# Patient Record
Sex: FEMALE | Race: WHITE | Employment: FULL TIME | URBAN - METROPOLITAN AREA
[De-identification: names, ages, dates, MRNs, and addresses within clinical notes are randomized per-mention and may not be internally consistent; named-entity substitution may affect disease eponyms.]

---

## 2018-11-21 ENCOUNTER — TRANSFERRED RECORDS (OUTPATIENT)
Dept: HEALTH INFORMATION MANAGEMENT | Facility: CLINIC | Age: 48
End: 2018-11-21

## 2018-11-29 ENCOUNTER — TRANSFERRED RECORDS (OUTPATIENT)
Dept: HEALTH INFORMATION MANAGEMENT | Facility: CLINIC | Age: 48
End: 2018-11-29

## 2018-12-06 ENCOUNTER — TRANSFERRED RECORDS (OUTPATIENT)
Dept: HEALTH INFORMATION MANAGEMENT | Facility: CLINIC | Age: 48
End: 2018-12-06

## 2018-12-19 NOTE — TELEPHONE ENCOUNTER
Rec'd Vm from [facility] - Said they were sending slides over to us. Returned call, left voice message. Called [facility] they have no record of pt having mammogram done @ their site, or any of their associated sites.   3:03 PM

## 2018-12-19 NOTE — TELEPHONE ENCOUNTER
Path request sent to [facility]. Called [facility], left voice message. Also called [facility]  [phone], left a voice message requesting   11:01 AM

## 2018-12-19 NOTE — TELEPHONE ENCOUNTER
RECORDS STATUS - BREAST    RECORDS RECEIVED FROM: [facility], [facility], [facility]   DATE RECEIVED: December 19, 2018   NOTES STATUS DETAILS   OFFICE NOTE from referring provider     OFFICE NOTE from medical oncologist     OFFICE NOTE from surgeon     OFFICE NOTE from radiation oncologist     DISCHARGE SUMMARY from hospital     DISCHARGE REPORT from the ER     OPERATIVE REPORT     MEDICATION LIST     CLINICAL TRIAL TREATMENTS TO DATE     LABS     PATHOLOGY REPORTS  (Tissue diagnosis, Stage, ER/MD percentage positive and intensity of staining, HER2 IHC, FISH, and all biopsies from breast and any distant metastasis)                     GENONOMIC TESTING     TYPE:   (Next Generation Sequencing, including Foundation One testing, and Oncotype score)     IMAGING (NEED IMAGES & REPORT)     CT SCANS     MRI     MAMMO     ULTRASOUND     PET     BONE SCAN     BRAIN MRI

## 2018-12-20 ENCOUNTER — PRE VISIT (OUTPATIENT)
Dept: ONCOLOGY | Facility: CLINIC | Age: 48
End: 2018-12-20

## 2018-12-20 ENCOUNTER — ONCOLOGY VISIT (OUTPATIENT)
Dept: ONCOLOGY | Facility: CLINIC | Age: 48
End: 2018-12-20
Attending: SURGERY
Payer: COMMERCIAL

## 2018-12-20 VITALS
BODY MASS INDEX: 38.05 KG/M2 | RESPIRATION RATE: 14 BRPM | DIASTOLIC BLOOD PRESSURE: 83 MMHG | TEMPERATURE: 97.3 F | OXYGEN SATURATION: 99 % | HEART RATE: 70 BPM | SYSTOLIC BLOOD PRESSURE: 135 MMHG | WEIGHT: 228.4 LBS | HEIGHT: 65 IN

## 2018-12-20 DIAGNOSIS — D05.12 DUCTAL CARCINOMA IN SITU (DCIS) OF LEFT BREAST: ICD-10-CM

## 2018-12-20 PROCEDURE — G0463 HOSPITAL OUTPT CLINIC VISIT: HCPCS | Mod: ZF

## 2018-12-20 RX ORDER — CEFAZOLIN SODIUM 2 G/50ML
2 SOLUTION INTRAVENOUS
Status: CANCELLED | OUTPATIENT
Start: 2018-12-20

## 2018-12-20 RX ORDER — CEFAZOLIN SODIUM 1 G/50ML
1 INJECTION, SOLUTION INTRAVENOUS SEE ADMIN INSTRUCTIONS
Status: CANCELLED | OUTPATIENT
Start: 2018-12-20

## 2018-12-20 ASSESSMENT — MIFFLIN-ST. JEOR: SCORE: 1671.9

## 2018-12-20 NOTE — TELEPHONE ENCOUNTER
Left VM @ [facility]. Spoke w/ [facility] @ [facility] - pushing Mammogram to PACS. Spoke w/ Damion - [facility] - he couldn't really see any chart/office visit notes for the pt w/ her provider. Last visit they had was 8/25/2017-Per Damion they are faxing over Mammogram Reports & last office visit note.  7:26 AM

## 2018-12-20 NOTE — TELEPHONE ENCOUNTER
I called & spoke with Reshma Hendricks re: records needed for visit. As we went through [facility]where together, we came to the conclusion the mammography reports from [facility] is what is needed. I have contacted [facility] & the reports will be faxed to me shortly. Reshma asked that I just have them scanned ASAP as the patient has already left.

## 2018-12-20 NOTE — TELEPHONE ENCOUNTER
Pathology slides received from [facility] & sent to [facility] path dept for review.    Case # [id number] (13 slides & 1 report)

## 2018-12-20 NOTE — PROGRESS NOTES
HISTORY OF PRESENT ILLNESS:  Marlene Pascual is a 48-year-old woman who presents for a new diagnosis of ductal carcinoma in situ of the left breast.  She had a screening mammogram which revealed an area of microcalcifications in the left breast measuring about 1 cm in size.  She had a core-needle biopsy which revealed a grade 2 ductal carcinoma in situ, ER positive.  She is now here to talk about treatment options.  She has not had any prior history of any breast problems.      FAMILY HISTORY:  Unknown because she is adopted.      PAST MEDICAL HISTORY:  Significant for no major medical problems.      PHYSICAL EXAMINATION:     GENERAL:  She is a well-appearing woman in no apparent distress.     HEAD AND NECK EXAMINATION:  Reveals no cervical, supraclavicular or infraclavicular lymphadenopathy.   CHEST:  Bilateral breast examination reveals no dominant masses, skin change, nipple changes or axillary adenopathy.      ASSESSMENT:  A 1 cm DCIS, left breast.      PLAN:  I talked to her about the natural history of DCIS and treatment options.  I did recommend a minimum of a lumpectomy or excision.  Depending upon the findings, she may or may not benefit from radiation therapy.  However, before her surgery, I have recommended that she see a genetic counselor and consider genetic testing because of her young age and unknown family history.  We will tentatively schedule her for both of those.      TT:  30 minutes.  CT:  20 minutes.

## 2018-12-21 ENCOUNTER — TELEPHONE (OUTPATIENT)
Dept: ONCOLOGY | Facility: CLINIC | Age: 48
End: 2018-12-21

## 2018-12-21 PROCEDURE — 00000346 ZZHCL STATISTIC REVIEW OUTSIDE SLIDES TC 88321: Performed by: SURGERY

## 2018-12-21 NOTE — TELEPHONE ENCOUNTER
ONCOLOGY INTAKE: Records Information      APPT INFORMATION: 01/09   Referring provider:  Dr. Brito  Referring provider s clinic:  Oncology  Reason for visit/diagnosis:  ductal carcinoma in situ of the left breast    Were the records received with the referral (via [employer])? Complete    Has patient been seen for any external appt for this diagnosis (enter clinic/location)? NA

## 2018-12-24 ENCOUNTER — TELEPHONE (OUTPATIENT)
Dept: ONCOLOGY | Facility: CLINIC | Age: 48
End: 2018-12-24

## 2019-01-02 NOTE — TELEPHONE ENCOUNTER
RECORDS STATUS - BREAST    RECORDS RECEIVED FROM: [facility]   DATE RECEIVED: 1/2/19   NOTES STATUS DETAILS   OFFICE NOTE from referring provider     OFFICE NOTE from medical oncologist     OFFICE NOTE from surgeon     OFFICE NOTE from radiation oncologist     DISCHARGE SUMMARY from hospital     DISCHARGE REPORT from the      OPERATIVE REPORT     MEDICATION LIST     CLINICAL TRIAL TREATMENTS TO DATE     LABS     PATHOLOGY REPORTS  (Tissue diagnosis, Stage, ER/ID percentage positive and intensity of staining, HER2 IHC, FISH, and all biopsies from breast and any distant metastasis)                     GENONOMIC TESTING     TYPE:   (Next Generation Sequencing, including Foundation One testing, and Oncotype score)     IMAGING (NEED IMAGES & REPORT)     CT SCANS     MRI     MAMMO     ULTRASOUND     PET     BONE SCAN     BRAIN MRI

## 2019-01-04 ENCOUNTER — TRANSFERRED RECORDS (OUTPATIENT)
Dept: HEALTH INFORMATION MANAGEMENT | Facility: CLINIC | Age: 49
End: 2019-01-04

## 2019-01-09 ENCOUNTER — OFFICE VISIT (OUTPATIENT)
Dept: ONCOLOGY | Facility: CLINIC | Age: 49
End: 2019-01-09
Attending: GENETIC COUNSELOR, MS
Payer: COMMERCIAL

## 2019-01-09 ENCOUNTER — PRE VISIT (OUTPATIENT)
Dept: ONCOLOGY | Facility: CLINIC | Age: 49
End: 2019-01-09

## 2019-01-09 DIAGNOSIS — D05.12 DUCTAL CARCINOMA IN SITU (DCIS) OF LEFT BREAST: ICD-10-CM

## 2019-01-09 DIAGNOSIS — D05.12 DUCTAL CARCINOMA IN SITU (DCIS) OF LEFT BREAST: Primary | ICD-10-CM

## 2019-01-09 LAB — MISCELLANEOUS TEST: NORMAL

## 2019-01-09 PROCEDURE — 96040 ZZH GENETIC COUNSELING, EACH 30 MINUTES: CPT | Mod: ZF | Performed by: GENETIC COUNSELOR, MS

## 2019-01-09 NOTE — PATIENT INSTRUCTIONS
Assessing Cancer Risk  Only about 5-10% of cancers are thought to be due to an inherited cancer susceptibility gene.    These families often have:    Several people with the same or related types of cancer    Cancers diagnosed at a young age (before age 50)    Individuals with more than one primary cancer    Multiple generations of the family affected with cancer    Some people may be candidates for genetic testing of more than one gene.  For these families, genetic testing using a cancer panel may be offered.  These panels will test different genes known to increase the risk for breast, ovarian, uterine, and/or other cancers. All of the genes discussed below have published clinical management guidelines for individuals who are found to carry a mutation. The purpose of this handout is to serve as a brief summary of the genes analyzed by the panels used to inquire about hereditary breast and gynecologic cancer:  STACEY, BRCA1, BRCA2, BRIP1, CDH1, CHEK2, MLH1, MSH2, MSH6, PMS2, EPCAM, PTEN, PALB2, RAD51C, RAD51D, and TP53.  ______________________________________________________________________________  Hereditary Breast and Ovarian Cancer Syndrome   (BRCA1 and BRCA2)  A single mutation in one of the copies of BRCA1 or BRCA2 increases the risk for breast and ovarian cancer, among others.  The risk for pancreatic cancer and melanoma may also be slightly increased in some families.  The chart below shows the chance that someone with a BRCA mutation would develop cancer in his or her lifetime1,2,3,4.        A person s ethnic background is also important to consider, as individuals of Ashkenazi Shinto ancestry have a higher chance of having a BRCA gene mutation.  There are three BRCA mutations that occur more frequently in this population.    Short Syndrome   (MLH1, MSH2, MSH6, PMS2, and EPCAM)  Currently five genes are known to cause Short Syndrome: MLH1, MSH2, MSH6, PMS2, and EPCAM.  A single mutation in one of the  Short Syndrome genes increases the risk for colon, endometrial, ovarian, and stomach cancers.  Other cancers that occur less commonly in Short Syndrome include urinary tract, skin, and brain cancers.  The chart below shows the chance that a person with Short syndrome would develop cancer in his or her lifetime5.      *Cancer risk varies depending on Short syndrome gene found    Cowden Syndrome   (PTEN)  Cowden syndrome is a hereditary condition that increases the risk for breast, thyroid, endometrial, colon, and kidney cancer.  Cowden syndrome is caused by a mutation in the PTEN gene.  A single mutation in one of the copies of PTEN causes Cowden syndrome and increases cancer risk.  The chart below shows the chance that someone with a PTEN mutation would develop cancer in their lifetime6,7.  Other benign features seen in some individuals with Cowden syndrome include benign skin lesions (facial papules, keratoses, lipomas), learning disability, autism, thyroid nodules, colon polyps, and larger head size.      *One recent study found breast cancer risk to be increased to 85%    Li-Fraumeni Syndrome   (TP53)  Li-Fraumeni Syndrome (LFS) is a cancer predisposition syndrome caused by a mutation in the TP53 gene. A single mutation in one of the copies of TP53 increases the risk for multiple cancers. Individuals with LFS are at an increased risk for developing cancer at a young age. The lifetime risk for development of a LFS-associated cancer is 50% by age 30 and 90% by age 60.   Core Cancers: Sarcomas, Breast, Brain, Lung, Leukemias/Lymphomas, Adrenocortical carcinomas  Other Cancers: Gastrointestinal, Thyroid, Skin, Genitourinary    Hereditary Diffuse Gastric Cancer   (CDH1)  Currently, one gene is known to cause hereditary diffuse gastric cancer (HDGC): CDH1.  Individuals with HDGC are at increased risk for diffuse gastric cancer and lobular breast cancer. Of people diagnosed with HDGC, 30-50% have a mutation in the CDH1  gene.  This suggests there are likely other genes that may cause HDGC that have not been identified yet.      Lifetime Cancer Risks    General Population HDGC    Diffuse Gastric  <1% ~80%   Breast 12% 39-52%         Additional Genes  STACEY  STACEY is a moderate-risk breast cancer gene. Women who have a mutation in STACEY can have between a 2-4 fold increased risk for breast cancer compared to the general population8. STACEY mutations have also been associated with increased risk for pancreatic cancer, however an estimate of this cancer risk is not well understood9. Individuals who inherit two STACEY mutations have a condition called ataxia-telangiectasia (AT).  This rare autosomal recessive condition affects the nervous system and immune system, and is associated with progressive cerebellar ataxia beginning in childhood.  Individuals with ataxia-telangiectasia often have a weakened immune system and have an increased risk for childhood cancers.    PALB2  Mutations in PALB2 have been shown to increase the risk of breast cancer up to 33-58% in some families; where individuals fall within this risk range is dependent upon family [id number]. PALB2 mutations have also been associated with increased risk for pancreatic cancer, although this risk has not been quantified yet.  Individuals who inherit two PALB2 mutations--one from their mother and one from their father--have a condition called Fanconi Anemia.  This rare autosomal recessive condition is associated with short stature, developmental delay, bone marrow failure, and increased risk for childhood cancers.    CHEK2   CHEK2 is a moderate-risk breast cancer gene.  Women who have a mutation in CHEK2 have around a 2-fold increased risk for breast cancer compared to the general population, and this risk may be higher depending upon family history.11,12,13 Mutations in CHEK2 have also been shown to increase the risk of a number of other cancers, including colon and prostate, however  these cancer risks are currently not well understood.    BRIP1, RAD51C and RAD51D  Mutations in BRIP1, RAD51C, and RAD51D have been shown to increase the risk of ovarian cancer and possibly female breast cancer as well14,15 .       Lifetime Cancer Risk    General Population BRIP1 RAD51C RAD51D   Ovarian 1-2% ~5-8% ~5-9% ~7-15%           Inheritance  All of the cancer syndromes reviewed above are inherited in an autosomal dominant pattern.  This means that if a parent has a mutation, each of his or her children will have a 50% chance of inheriting that same mutation.  Therefore, each child--male or female--would have a 50% chance of being at increased risk for developing cancer.      Image obtained from Genetics Home Reference, 2013     Mutations in some genes can occur de tyra, which means that a person s mutation occurred for the first time in them and was not inherited from a parent.  Now that they have the mutation, however, it can be passed on to future generations.    Genetic Testing  Genetic testing involves a blood test and will look at the genetic information in the STACEY, BRCA1, BRCA2, BRIP1, CDH1, CHEK2, MLH1, MSH2, MSH6, PMS2, EPCAM, PTEN, PALB2, RAD51C, RAD51D, and TP53 genes for any harmful mutations that are associated with increased cancer risk.  If possible, it is recommended that the person(s) who has had cancer be tested before other family members.  That person will give us the most useful information about whether or not a specific gene is associated with the cancer in the family.    Results  There are three possible results of genetic testing:    Positive--a harmful mutation was identified in one or more of the genes    Negative--no mutation was identified in any of the genes on this panel    Variant of unknown significance--a variation in one of the genes was identified, but it is unclear how this impacts cancer risk in the family    Advantages and Disadvantages   There are advantages and  disadvantages to genetic testing.    Advantages    May clarify your cancer risk    Can help you make medical decisions    May explain the cancers in your family    May give useful information to your family members (if you share your results)    Disadvantages    Possible negative emotional impact of learning about inherited cancer risk    Uncertainty in interpreting a negative test result in some situations    Possible genetic discrimination concerns (see below)    Genetic Information Nondiscrimination Act (CHRISTIN)  CHRISTIN is a federal law that protects individuals from health insurance or employment discrimination based on a genetic test result alone.  Although rare, there are currently no legal discrimination protections in terms of life insurance, long term care, or disability insurances.  Visit the [employer] [place] website to learn more.    Reducing Cancer Risk  All of the genes described above have nationally recognized cancer screening guidelines that would be recommended for individuals who test positive.  In addition to increased cancer screening, surgeries may be offered or recommended to reduce cancer risk.  Recommendations are based upon an individual s genetic test result as well as their personal and family history of cancer.    Questions to Think About Regarding Genetic Testing:    What effect will the test result have on me and my relationship with my family members if I have an inherited gene mutation?  If I don t have a gene mutation?    Should I share my test results, and how will my family react to this news, which may also affect them?    Are my children ready to learn new information that may one day affect their own health?    Hereditary Cancer Resources    FORCE: Facing Our Risk of Cancer Empowered facingourrisk.org   Bright Pink bebrightpink.org   Li-Fraumeni Syndrome Association lfsassociation.org   PTEN World PTENworld.com   No stomach for cancer, Inc.  nostomachforcancer.org   Stomach cancer relief network Scrnet.org   Collaborative Group of the Americas on Inherited Colorectal Cancer (CGA) cgaicc.com    Cancer Care cancercare.org   American Cancer Society (ACS) cancer.org   National Cancer Cashion (NCI) cancer.gov     Please call us if you have any questions or concerns.   Cancer Risk Management Program [phone] ([phone])  ? Felicita Rowland, MS, [facility]  [phone]  ? Victoria Arredondo, MS, [facility]  [phone]  ? Adelaida Kendrick, MS, [facility]  [phone]  ? Mariano Boyd, MS, [facility]  [phone]  ? Maddie Cas, MS, [facility] [phone]    References  1. Iglesia SNOW, Jaqueline PDP, Flavio S, Niko ADHIKARI, Charlette JE, Sulema JL, Eliseo N, Nicolasa H, Debo O, Marisol A, Kati B, Carlito P, Darrius S, Nick DM, Rupert N, Kathryn E, Catarino H, Travon E, Lubinski J, Gronwald J, Chavez B, Tulinius H, Thorlacius S, Eerola H, Dipika H, Monica K, Chidi OP. Average risks of breast and ovarian cancer associated with BRCA1 or BRCA2 mutations detected in case series unselected for family history: a combined analysis of 222 studies. Am J Hum Lilliam. 2003;72:1117-30.  2. Elle N, Ariadna M, Maurizio G.  BRCA1 and BRCA2 Hereditary Breast and Ovarian Cancer. Gene Reviews online. 2013.  3. Felipe YC, Ina S, Abdias G, Ahn S. Breast cancer risk among male BRCA1 and BRCA2 mutation carriers. J Natl Cancer Inst. 2007;99:1811-4.  4. Teja ARREDONDO, Jeffry I, Dylan J, Eliecer E, Kassandra ER, Jeanine F. Risk of breast cancer in male BRCA2 carriers. J Med Lilliam. 2010;47:710-1.  5. National Comprehensive Cancer Network. Clinical practice guidelines in oncology, colorectal cancer screening. Available online (registration required). 2015.  6. Rocael PERRY, Lolis J, Conchis J, Sakshi LA, Sim MS, Eng C. Lifetime cancer risks in individuals with germline PTEN mutations. Clin Cancer Res. 2012;18:400-7.  7. Aracelis SEWELL. Cowden Syndrome: A Critical Review of the Clinical Literature. J Lilliam .  2009:18:13-27.  8. Gisselle A, Evan D, Warren S, Chinyere P, Chaparro T, Kia M, Elias B, Gabe H, Honey R, Skyler K, Madelyn L, Teja DG, Nick D, Dandre DF, Sánchez MR, The Breast Cancer Susceptibility Collaboration () & Nataliia CORONA. STACEY mutations that cause ataxia-telangiectasia are breast cancer susceptibility alleles. Nature Genetics. 2006;38:873-875  9. Grayson N , Judah Y, Olga J, Shayy L, Aileen GM , Chayito ML, Gallinger S, Acosta AG, Syngal S, Jeana ML, Morena J , Claudio R, Meg SZ, Maryam JR, Cheo VE, Lyle M, Vocarmelo B, Anibal N, Gayathri RH, Myah KW, and Izabela AP. STACEY mutations in patients with hereditary pancreatic cancer. Cancer Discover. 2012;2:41-46  10. Iglesia KATZ, et al. Breast-Cancer Risk in Families with Mutations in PALB2. NEJM. 2014; 371(6):497-506.  11. CHEK2 Breast Cancer Case-Control Consortium. CHEK2*1100delC and susceptibility to breast cancer: A collaborative analysis involving 10,860 breast cancer cases and 9,065 controls from 10 studies. Am J Hum Lilliam, 74 (2004), pp. 2131-9452  12. Abel T, Jordon S, Julius K, et al. Spectrum of Mutations in BRCA1, BRCA2, CHEK2, and TP53 in Families at High Risk of Breast Cancer. AGATHA. 2006;295(12):1095-9683.   13. Kervin C, Marcio D, Stuart A, et al. Risk of breast cancer in women with a CHEK2 mutation with and without a family history of breast cancer. J Clin Oncol. 2011;29:5240-0485.  14. Kit MC, Khushbu E, Shirin SJ, et al. Contribution of germline mutations in the RAD51B, RAD51C, and RAD51D genes to ovarian cancer in the population. J Clin Oncol. 2015;33(26):6875-9401. Doi:10.1200/JCO.2015.61.2408.  15. Kwan T, Phyllis REAL, Curt P, et al. Mutations in BRIP1 confer high risk of ovarian cancer. Anay Lilliam. 2011;43(11):9960-7914. doi:10.1038/ng.955.

## 2019-01-09 NOTE — LETTER
1/9/2019       RE: Marlene Pascual  [address]  [facility] [address]     Dear Colleague,    Thank you for referring your patient, Marlene Pascual, to the [facility]. Please see a copy of my visit note below.    1/9/2019    Referring Provider: Axel Brito MD    Presenting Information:   I met with Marlene Pascual today for genetic counseling at the Cancer Risk Management Program at the [facility] to discuss her personal history of breast cancer.  She is here today to review this history and available genetic testing options.    Personal History:  Marlene is a 48 year old female.  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  She had her first menstrual period at age 13, has not gone through menopause and does not have children.  Marlene has her ovaries, fallopian tubes and uterus in place, and reports that she has a pelvic ultrasound planned for this Friday through her Ob-GYN office due to pain during her menstrual cycle.  She reports a history of one benign mole removed from her back.        Family History: (Please see scanned pedigree for detailed family history information)    No information is available regarding Marlene's biological family due to adoption    Her reported ethnicity is Scottish/Romanian.      Discussion:    Marlene's personal history of breast cancer is suggestive of a hereditary cancer syndrome.    We reviewed the features of sporadic, familial, and hereditary cancers.  Based on her personal history of breast cancer diagnosed prior to age 51 and no family history information available due to adoption, Marlene meets current National Comprehensive Cancer Network (NCCN) criteria for genetic testing of BRCA1/2.      We discussed the natural history and genetics of Hereditary Breast and Ovarian Cancer syndrome caused by mutations in the BRCA1/2  genes. We discussed that there are additional genes that could cause increased risk for breast cancer.  As many of these genes present with overlapping features in a family and accurate cancer risk cannot always be established based upon the pedigree analysis alone, it would be reasonable for Marlene to consider panel genetic testing to analyze multiple genes at once.    A detailed handout regarding hereditary breast cancer and the information we discussed was provided to Marlene at the end of our appointment today and can be found in the after visit summary.  Topics included: inheritance pattern, cancer risks, cancer screening recommendations, and also risks, benefits and limitations of testing.    We reviewed genetic testing options for hereditary breast and gynecologic cancers: actionable high/moderate risk breast and gynecologic cancer risk custom panel (CustomNext-Cancer, 19 genes, a combination of GynPlus and BRCAplus + NBN, STK11, and NF1) and expanded high and moderate risk panel testing (OvaNext, 25 genes or CancerNext, 34 genes).  Marlene expressed an interest in learning as much information as possible from the testing given her limited family history information and early onset cancer. She opted for the CancerNext panel.  Genetic testing is available for 34 genes associated with hereditary cancer: CancerNext (APC, STACEY, BARD1, BRCA1, BRCA2, BRIP1, BMPR1A, CDH1, CDK4, CDKN2A, CHEK2, DICER1, EPCAM, GREM1, HOXB13, MLH1, MRE11A, MSH2, MSH6, MUTYH, NBN, NF1, PALB2, PMS2, POLD1, POLE, PTEN, RAD50, RAD51C, RAD51D, SMAD4, SMARCA4, STK11, and TP53).  We discussed that many of the genes in the CancerNext panel have known risks and published management guidelines.  Some genes are associated with specific hereditary cancer syndromes: Hereditary Breast and Ovarian Cancer syndrome (BRCA1, BRCA2), Short syndrome (MLH1, MSH2, MSH6, PMS2, EPCAM), Familial Adenomatous Polyposis syndrome (APC), Hereditary Gastric Cancer syndrome  (CDH1), Familial Atypical Multiple Mole Melanoma syndrome (CDK4, CDKN2A), Juvenile Polyposis syndrome (BMPR1A, SMAD4), Cowden syndrome (PTEN), Li Fraumeni syndrome (TP53), Peutz-Jeghers syndrome (STK11), MUTYH Associated Polyposis syndrome (MUTYH), and Neurofibromatosis type 1 (NF1).   The STACEY, BRIP1, CHEK2, GREM1, NBN , PALB2, POLD1, POLE, RAD51C, and RAD51D genes are associated with increased cancer risk and have published management guidelines for certain cancers.    The remaining genes (BARD1, DICER1, HOXB13, MRE11A, RAD50, and SMARCA4) are associated with increased cancer risk and may allow us to make medical recommendations when mutations are identified.    Consent was obtained and genetic testing for CancerNext was sent to [facility]. Turn around time: 3-4 weeks.  Results for 8 genes (BRCAplus) will be reported first within 1-2 weeks for surgical decision making.      Marlene was provided with a CancerNext handout, which lists the included genes and associated cancer risks, from [facility].    Medical Management: For Marlene, we reviewed that the information from genetic testing may determine:    surgery to treat Marlene's active cancer diagnosis (i.e. lumpectomy versus bilateral mastectomy),    additional cancer screening for which Marlene may qualify (i.e. mammogram and breast MRI if appropriate, more frequent colonoscopies, more frequent dermatologic exams, etc.),    options for risk reducing surgeries Marlene could consider (i.e. bilateral mastectomy, surgery to remove the ovaries and/or uterus, etc.),      and targeted chemotherapies for certain cancers if needed in the future (i.e. immunotherapy for individuals with Short syndrome, PARP inhibitors, etc.).     These recommendations will be discussed in detail once genetic testing is completed.     Plan:  1) Today Marlene elected to proceed with the CancerNext gene panel offered through [facility].  2) This information should be available in  3-4 weeks, with results for 8 STAT breast cancer genes within 1-2 weeks.  3) Marlene will be contacted once results are available.     Face to face time: 45 minutes    Adelaida Kendrick MS, [facility]  Licensed Genetic Counselor  [phone]

## 2019-01-09 NOTE — LETTER
Cancer Risk Management  Program Locations    [facility] Cancer [facility] Cancer [facility]  Mailing Address  Cancer Risk Management Program  [facility]  [address] [place] [address]    New patient appointments  [phone]  January 11, 2019    Marlene Pascual  [address]   [facility] [address]      Dear Marlene,    It was a pleasure meeting with you at the [facility] on 1/9/2019.  Here is a copy of the progress note from your recent genetic counseling visit to the Cancer Risk Management Program.  If you have any additional questions, please feel free to call.    Referring Provider: Axel Brito MD    Presenting Information:   I met with Marlene Pascual today for genetic counseling at the Cancer Risk Management Program at the [facility] to discuss her personal history of breast cancer.  She is here today to review this history and available genetic testing options.    Personal History:  Marlene is a 48 year old female.  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  She had her first menstrual period at age 13, has not gone through menopause and does not have children.  Marlene has her ovaries, fallopian tubes and uterus in place, and reports that she has a pelvic ultrasound planned for this Friday through her Ob-GYN office due to pain during her menstrual cycle.  She reports a history of one benign mole removed from her back.        Family History: (Please see scanned pedigree for detailed family history information)    No information is available regarding Marlene's biological family due to adoption    Her reported ethnicity is Bengali/Croatian.      Discussion:    Marlene's personal  history of breast cancer is suggestive of a hereditary cancer syndrome.    We reviewed the features of sporadic, familial, and hereditary cancers.  Based on her personal history of breast cancer diagnosed prior to age 51 and no family history information available due to adoption, Marlene meets current National Comprehensive Cancer Network (NCCN) criteria for genetic testing of BRCA1/2.      We discussed the natural history and genetics of Hereditary Breast and Ovarian Cancer syndrome caused by mutations in the BRCA1/2 genes. We discussed that there are additional genes that could cause increased risk for breast cancer.  As many of these genes present with overlapping features in a family and accurate cancer risk cannot always be established based upon the pedigree analysis alone, it would be reasonable for Marlene to consider panel genetic testing to analyze multiple genes at once.    A detailed handout regarding hereditary breast cancer and the information we discussed was provided to Marlene at the end of our appointment today and can be found in the after visit summary.  Topics included: inheritance pattern, cancer risks, cancer screening recommendations, and also risks, benefits and limitations of testing.    We reviewed genetic testing options for hereditary breast and gynecologic cancers: actionable high/moderate risk breast and gynecologic cancer risk custom panel (CustomNext-Cancer, 19 genes, a combination of GynPlus and BRCAplus + NBN, STK11, and NF1) and expanded high and moderate risk panel testing (OvaNext, 25 genes or CancerNext, 34 genes).  Marlene expressed an interest in learning as much information as possible from the testing given her limited family history information and early onset cancer. She opted for the CancerNext panel.  Genetic testing is available for 34 genes associated with hereditary cancer: CancerNext (APC, STACEY, BARD1, BRCA1, BRCA2, BRIP1, BMPR1A, CDH1, CDK4, CDKN2A, CHEK2, DICER1, EPCAM,  GREM1, HOXB13, MLH1, MRE11A, MSH2, MSH6, MUTYH, NBN, NF1, PALB2, PMS2, POLD1, POLE, PTEN, RAD50, RAD51C, RAD51D, SMAD4, SMARCA4, STK11, and TP53).  We discussed that many of the genes in the CancerNext panel have known risks and published management guidelines.  Some genes are associated with specific hereditary cancer syndromes: Hereditary Breast and Ovarian Cancer syndrome (BRCA1, BRCA2), Short syndrome (MLH1, MSH2, MSH6, PMS2, EPCAM), Familial Adenomatous Polyposis syndrome (APC), Hereditary Gastric Cancer syndrome (CDH1), Familial Atypical Multiple Mole Melanoma syndrome (CDK4, CDKN2A), Juvenile Polyposis syndrome (BMPR1A, SMAD4), Cowden syndrome (PTEN), Li Fraumeni syndrome (TP53), Peutz-Jeghers syndrome (STK11), MUTYH Associated Polyposis syndrome (MUTYH), and Neurofibromatosis type 1 (NF1).   The STACEY, BRIP1, CHEK2, GREM1, NBN , PALB2, POLD1, POLE, RAD51C, and RAD51D genes are associated with increased cancer risk and have published management guidelines for certain cancers.    The remaining genes (BARD1, DICER1, HOXB13, MRE11A, RAD50, and SMARCA4) are associated with increased cancer risk and may allow us to make medical recommendations when mutations are identified.    Consent was obtained and genetic testing for CancerNext was sent to [facility]. Turn around time: 3-4 weeks.  Results for 8 genes (BRCAplus) will be reported first within 1-2 weeks for surgical decision making.      Marlene was provided with a CancerNext handout, which lists the included genes and associated cancer risks, from [facility].    Medical Management: For Marlene, we reviewed that the information from genetic testing may determine:    surgery to treat Marlene's active cancer diagnosis (i.e. lumpectomy versus bilateral mastectomy),    additional cancer screening for which Marlene may qualify (i.e. mammogram and breast MRI if appropriate, more frequent colonoscopies, more frequent dermatologic exams, etc.),    options for  risk reducing surgeries Marlene could consider (i.e. bilateral mastectomy, surgery to remove the ovaries and/or uterus, etc.),      and targeted chemotherapies for certain cancers if needed in the future (i.e. immunotherapy for individuals with Short syndrome, PARP inhibitors, etc.).     These recommendations will be discussed in detail once genetic testing is completed.     Plan:  1) Today Marlene elected to proceed with the CancerNext gene panel offered through [facility].  2) This information should be available in 3-4 weeks, with results for 8 STAT breast cancer genes within 1-2 weeks.  3) Marlene will be contacted once results are available.     Adelaida Kendirck MS, [facility]  Licensed Genetic Counselor  [phone]

## 2019-01-09 NOTE — NURSING NOTE
Chief Complaint   Patient presents with     Labs Only     labs drawn via venipuncture by RN     There were no vitals taken for this visit.    Labs collected and sent from right antecubital venipuncture in lab by RN. Pt tolerated well.     Joanne Goode RN

## 2019-01-10 NOTE — PROGRESS NOTES
1/9/2019    Referring Provider: Axel Brito MD    Presenting Information:   I met with Marlene Pascual today for genetic counseling at the Cancer Risk Management Program at the [facility] to discuss her personal history of breast cancer.  She is here today to review this history and available genetic testing options.    Personal History:  Marlene is a 48 year old female.  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  She had her first menstrual period at age 13, has not gone through menopause and does not have children.  Marlene has her ovaries, fallopian tubes and uterus in place, and reports that she has a pelvic ultrasound planned for this Friday through her Ob-GYN office due to pain during her menstrual cycle.  She reports a history of one benign mole removed from her back.        Family History: (Please see scanned pedigree for detailed family history information)    No information is available regarding Marlene's biological family due to adoption    Her reported ethnicity is Slovak/Uzbek.      Discussion:    Marlene's personal history of breast cancer is suggestive of a hereditary cancer syndrome.    We reviewed the features of sporadic, familial, and hereditary cancers.  Based on her personal history of breast cancer diagnosed prior to age 51 and no family history information available due to adoption, Marlene meets current National Comprehensive Cancer Network (NCCN) criteria for genetic testing of BRCA1/2.      We discussed the natural history and genetics of Hereditary Breast and Ovarian Cancer syndrome caused by mutations in the BRCA1/2 genes. We discussed that there are additional genes that could cause increased risk for breast cancer.  As many of these genes present with overlapping features in a family and accurate cancer risk cannot always be established based  upon the pedigree analysis alone, it would be reasonable for Marlene to consider panel genetic testing to analyze multiple genes at once.    A detailed handout regarding hereditary breast cancer and the information we discussed was provided to Marlene at the end of our appointment today and can be found in the after visit summary.  Topics included: inheritance pattern, cancer risks, cancer screening recommendations, and also risks, benefits and limitations of testing.    We reviewed genetic testing options for hereditary breast and gynecologic cancers: actionable high/moderate risk breast and gynecologic cancer risk custom panel (CustomNext-Cancer, 19 genes, a combination of GynPlus and BRCAplus + NBN, STK11, and NF1) and expanded high and moderate risk panel testing (OvaNext, 25 genes or CancerNext, 34 genes).  Marlene expressed an interest in learning as much information as possible from the testing given her limited family history information and early onset cancer. She opted for the CancerNext panel.  Genetic testing is available for 34 genes associated with hereditary cancer: CancerNext (APC, STACEY, BARD1, BRCA1, BRCA2, BRIP1, BMPR1A, CDH1, CDK4, CDKN2A, CHEK2, DICER1, EPCAM, GREM1, HOXB13, MLH1, MRE11A, MSH2, MSH6, MUTYH, NBN, NF1, PALB2, PMS2, POLD1, POLE, PTEN, RAD50, RAD51C, RAD51D, SMAD4, SMARCA4, STK11, and TP53).  We discussed that many of the genes in the CancerNext panel have known risks and published management guidelines.  Some genes are associated with specific hereditary cancer syndromes: Hereditary Breast and Ovarian Cancer syndrome (BRCA1, BRCA2), Short syndrome (MLH1, MSH2, MSH6, PMS2, EPCAM), Familial Adenomatous Polyposis syndrome (APC), Hereditary Gastric Cancer syndrome (CDH1), Familial Atypical Multiple Mole Melanoma syndrome (CDK4, CDKN2A), Juvenile Polyposis syndrome (BMPR1A, SMAD4), Cowden syndrome (PTEN), Li Fraumeni syndrome (TP53), Peutz-Jeghers syndrome (STK11), MUTYH Associated Polyposis  syndrome (MUTYH), and Neurofibromatosis type 1 (NF1).   The STACEY, BRIP1, CHEK2, GREM1, NBN , PALB2, POLD1, POLE, RAD51C, and RAD51D genes are associated with increased cancer risk and have published management guidelines for certain cancers.    The remaining genes (BARD1, DICER1, HOXB13, MRE11A, RAD50, and SMARCA4) are associated with increased cancer risk and may allow us to make medical recommendations when mutations are identified.    Consent was obtained and genetic testing for CancerNext was sent to [facility]. Turn around time: 3-4 weeks.  Results for 8 genes (BRCAplus) will be reported first within 1-2 weeks for surgical decision making.      Marlene was provided with a CancerNext handout, which lists the included genes and associated cancer risks, from [facility].    Medical Management: For Marlene, we reviewed that the information from genetic testing may determine:    surgery to treat Marlene's active cancer diagnosis (i.e. lumpectomy versus bilateral mastectomy),    additional cancer screening for which Marlene may qualify (i.e. mammogram and breast MRI if appropriate, more frequent colonoscopies, more frequent dermatologic exams, etc.),    options for risk reducing surgeries Marlene could consider (i.e. bilateral mastectomy, surgery to remove the ovaries and/or uterus, etc.),      and targeted chemotherapies for certain cancers if needed in the future (i.e. immunotherapy for individuals with Short syndrome, PARP inhibitors, etc.).     These recommendations will be discussed in detail once genetic testing is completed.     Plan:  1) Today Marlene elected to proceed with the CancerNext gene panel offered through [facility].  2) This information should be available in 3-4 weeks, with results for 8 STAT breast cancer genes within 1-2 weeks.  3) Marlene will be contacted once results are available.     Face to face time: 45 minutes    Adelaida Kendrick MS, [facility]  Licensed Genetic  Counselor  [phone]

## 2019-01-11 ENCOUNTER — TRANSFERRED RECORDS (OUTPATIENT)
Dept: HEALTH INFORMATION MANAGEMENT | Facility: CLINIC | Age: 49
End: 2019-01-11

## 2019-01-17 ENCOUNTER — HOSPITAL ENCOUNTER (EMERGENCY)
Facility: CLINIC | Age: 49
Discharge: HOME | End: 2019-01-17
Attending: EMERGENCY MEDICINE | Admitting: EMERGENCY MEDICINE
Payer: COMMERCIAL

## 2019-01-17 ENCOUNTER — APPOINTMENT (OUTPATIENT)
Dept: CT IMAGING | Facility: CLINIC | Age: 49
End: 2019-01-17
Payer: COMMERCIAL

## 2019-01-17 VITALS
HEIGHT: 65 IN | TEMPERATURE: 97.7 F | RESPIRATION RATE: 16 BRPM | HEART RATE: 66 BPM | DIASTOLIC BLOOD PRESSURE: 60 MMHG | OXYGEN SATURATION: 97 % | SYSTOLIC BLOOD PRESSURE: 101 MMHG | BODY MASS INDEX: 38.01 KG/M2

## 2019-01-17 DIAGNOSIS — N20.0 KIDNEY STONE ON LEFT SIDE: ICD-10-CM

## 2019-01-17 DIAGNOSIS — R10.9 LEFT FLANK PAIN: ICD-10-CM

## 2019-01-17 DIAGNOSIS — R11.2 NON-INTRACTABLE VOMITING WITH NAUSEA, UNSPECIFIED VOMITING TYPE: ICD-10-CM

## 2019-01-17 LAB
ALBUMIN SERPL-MCNC: 3.9 G/DL (ref 3.4–5)
ALBUMIN UR-MCNC: 10 MG/DL
ALP SERPL-CCNC: 101 U/L (ref 40–150)
ALT SERPL W P-5'-P-CCNC: 24 U/L (ref 0–50)
ANION GAP SERPL CALCULATED.3IONS-SCNC: 8 MMOL/L (ref 3–14)
APPEARANCE UR: ABNORMAL
AST SERPL W P-5'-P-CCNC: 12 U/L (ref 0–45)
BACTERIA #/AREA URNS HPF: ABNORMAL /HPF
BASOPHILS # BLD AUTO: 0.1 10E9/L (ref 0–0.2)
BASOPHILS NFR BLD AUTO: 0.5 %
BILIRUB SERPL-MCNC: 0.5 MG/DL (ref 0.2–1.3)
BILIRUB UR QL STRIP: NEGATIVE
BUN SERPL-MCNC: 17 MG/DL (ref 7–30)
CALCIUM SERPL-MCNC: 8.9 MG/DL (ref 8.5–10.1)
CHLORIDE SERPL-SCNC: 105 MMOL/L (ref 94–109)
CO2 SERPL-SCNC: 25 MMOL/L (ref 20–32)
COLOR UR AUTO: YELLOW
CREAT SERPL-MCNC: 0.97 MG/DL (ref 0.52–1.04)
DIFFERENTIAL METHOD BLD: NORMAL
EOSINOPHIL # BLD AUTO: 0.2 10E9/L (ref 0–0.7)
EOSINOPHIL NFR BLD AUTO: 1.8 %
ERYTHROCYTE [DISTWIDTH] IN BLOOD BY AUTOMATED COUNT: 13.4 % (ref 10–15)
GFR SERPL CREATININE-BSD FRML MDRD: 70 ML/MIN/{1.73_M2}
GLUCOSE SERPL-MCNC: 128 MG/DL (ref 70–99)
GLUCOSE UR STRIP-MCNC: NEGATIVE MG/DL
HCG UR QL: NEGATIVE
HCT VFR BLD AUTO: 40.6 % (ref 35–47)
HGB BLD-MCNC: 13.2 G/DL (ref 11.7–15.7)
HGB UR QL STRIP: ABNORMAL
IMM GRANULOCYTES # BLD: 0.1 10E9/L (ref 0–0.4)
IMM GRANULOCYTES NFR BLD: 0.6 %
KETONES UR STRIP-MCNC: NEGATIVE MG/DL
LEUKOCYTE ESTERASE UR QL STRIP: ABNORMAL
LIPASE SERPL-CCNC: 79 U/L (ref 73–393)
LYMPHOCYTES # BLD AUTO: 2 10E9/L (ref 0.8–5.3)
LYMPHOCYTES NFR BLD AUTO: 20.9 %
MCH RBC QN AUTO: 29.6 PG (ref 26.5–33)
MCHC RBC AUTO-ENTMCNC: 32.5 G/DL (ref 31.5–36.5)
MCV RBC AUTO: 91 FL (ref 78–100)
MONOCYTES # BLD AUTO: 1.2 10E9/L (ref 0–1.3)
MONOCYTES NFR BLD AUTO: 12.4 %
MUCOUS THREADS #/AREA URNS LPF: PRESENT /LPF
NEUTROPHILS # BLD AUTO: 6.1 10E9/L (ref 1.6–8.3)
NEUTROPHILS NFR BLD AUTO: 63.8 %
NITRATE UR QL: NEGATIVE
NRBC # BLD AUTO: 0 10*3/UL
NRBC BLD AUTO-RTO: 0 /100
PH UR STRIP: 5 PH (ref 5–7)
PLATELET # BLD AUTO: 304 10E9/L (ref 150–450)
POTASSIUM SERPL-SCNC: 3.7 MMOL/L (ref 3.4–5.3)
PROT SERPL-MCNC: 8 G/DL (ref 6.8–8.8)
RBC # BLD AUTO: 4.46 10E12/L (ref 3.8–5.2)
RBC #/AREA URNS AUTO: 2 /HPF (ref 0–2)
SODIUM SERPL-SCNC: 138 MMOL/L (ref 133–144)
SOURCE: ABNORMAL
SP GR UR STRIP: 1.02 (ref 1–1.03)
SQUAMOUS #/AREA URNS AUTO: 22 /HPF (ref 0–1)
UROBILINOGEN UR STRIP-MCNC: NORMAL MG/DL (ref 0–2)
WBC # BLD AUTO: 9.5 10E9/L (ref 4–11)
WBC #/AREA URNS AUTO: 8 /HPF (ref 0–5)

## 2019-01-17 PROCEDURE — 96375 TX/PRO/DX INJ NEW DRUG ADDON: CPT

## 2019-01-17 PROCEDURE — 99285 EMERGENCY DEPT VISIT HI MDM: CPT | Mod: 25

## 2019-01-17 PROCEDURE — 80053 COMPREHEN METABOLIC PANEL: CPT | Performed by: EMERGENCY MEDICINE

## 2019-01-17 PROCEDURE — 85025 COMPLETE CBC W/AUTO DIFF WBC: CPT | Performed by: EMERGENCY MEDICINE

## 2019-01-17 PROCEDURE — 96374 THER/PROPH/DIAG INJ IV PUSH: CPT

## 2019-01-17 PROCEDURE — 81001 URINALYSIS AUTO W/SCOPE: CPT | Performed by: EMERGENCY MEDICINE

## 2019-01-17 PROCEDURE — 25000128 H RX IP 250 OP 636: Performed by: EMERGENCY MEDICINE

## 2019-01-17 PROCEDURE — 83690 ASSAY OF LIPASE: CPT | Performed by: EMERGENCY MEDICINE

## 2019-01-17 PROCEDURE — 25000128 H RX IP 250 OP 636

## 2019-01-17 PROCEDURE — 96361 HYDRATE IV INFUSION ADD-ON: CPT

## 2019-01-17 PROCEDURE — 81025 URINE PREGNANCY TEST: CPT | Performed by: EMERGENCY MEDICINE

## 2019-01-17 PROCEDURE — 25000125 ZZHC RX 250: Performed by: EMERGENCY MEDICINE

## 2019-01-17 PROCEDURE — 74177 CT ABD & PELVIS W/CONTRAST: CPT

## 2019-01-17 RX ORDER — IBUPROFEN 600 MG/1
600 TABLET, FILM COATED ORAL EVERY 6 HOURS PRN
Qty: 30 TABLET | Refills: 0 | Status: SHIPPED | OUTPATIENT
Start: 2019-01-17 | End: 2019-01-21

## 2019-01-17 RX ORDER — IOPAMIDOL 755 MG/ML
114 INJECTION, SOLUTION INTRAVASCULAR ONCE
Status: COMPLETED | OUTPATIENT
Start: 2019-01-17 | End: 2019-01-17

## 2019-01-17 RX ORDER — ONDANSETRON 2 MG/ML
INJECTION INTRAMUSCULAR; INTRAVENOUS
Status: COMPLETED
Start: 2019-01-17 | End: 2019-01-17

## 2019-01-17 RX ORDER — OXYCODONE AND ACETAMINOPHEN 5; 325 MG/1; MG/1
1-2 TABLET ORAL EVERY 4 HOURS PRN
Qty: 12 TABLET | Refills: 0 | Status: SHIPPED | OUTPATIENT
Start: 2019-01-17 | End: 2019-01-21

## 2019-01-17 RX ORDER — ONDANSETRON 2 MG/ML
4 INJECTION INTRAMUSCULAR; INTRAVENOUS ONCE
Status: COMPLETED | OUTPATIENT
Start: 2019-01-17 | End: 2019-01-17

## 2019-01-17 RX ORDER — ONDANSETRON 4 MG/1
4 TABLET, ORALLY DISINTEGRATING ORAL EVERY 8 HOURS PRN
Qty: 10 TABLET | Refills: 0 | Status: SHIPPED | OUTPATIENT
Start: 2019-01-17 | End: 2019-01-21

## 2019-01-17 RX ORDER — HYDROMORPHONE HYDROCHLORIDE 1 MG/ML
0.5 INJECTION, SOLUTION INTRAMUSCULAR; INTRAVENOUS; SUBCUTANEOUS
Status: DISCONTINUED | OUTPATIENT
Start: 2019-01-17 | End: 2019-01-17 | Stop reason: HOSPADM

## 2019-01-17 RX ORDER — KETOROLAC TROMETHAMINE 15 MG/ML
15 INJECTION, SOLUTION INTRAMUSCULAR; INTRAVENOUS ONCE
Status: COMPLETED | OUTPATIENT
Start: 2019-01-17 | End: 2019-01-17

## 2019-01-17 RX ADMIN — KETOROLAC TROMETHAMINE 15 MG: 15 INJECTION, SOLUTION INTRAMUSCULAR; INTRAVENOUS at 11:37

## 2019-01-17 RX ADMIN — HYDROMORPHONE HYDROCHLORIDE 0.5 MG: 1 INJECTION, SOLUTION INTRAMUSCULAR; INTRAVENOUS; SUBCUTANEOUS at 10:35

## 2019-01-17 RX ADMIN — ONDANSETRON 4 MG: 2 INJECTION INTRAMUSCULAR; INTRAVENOUS at 10:47

## 2019-01-17 RX ADMIN — IOPAMIDOL 114 ML: 755 INJECTION, SOLUTION INTRAVENOUS at 11:08

## 2019-01-17 RX ADMIN — SODIUM CHLORIDE 1000 ML: 9 INJECTION, SOLUTION INTRAVENOUS at 10:37

## 2019-01-17 RX ADMIN — HYDROMORPHONE HYDROCHLORIDE 0.5 MG: 1 INJECTION, SOLUTION INTRAMUSCULAR; INTRAVENOUS; SUBCUTANEOUS at 10:49

## 2019-01-17 RX ADMIN — SODIUM CHLORIDE 75 ML: 9 INJECTION, SOLUTION INTRAVENOUS at 11:06

## 2019-01-17 ASSESSMENT — ENCOUNTER SYMPTOMS
NAUSEA: 0
FREQUENCY: 1
FEVER: 0
ABDOMINAL PAIN: 1
VOMITING: 0

## 2019-01-17 NOTE — ED PROVIDER NOTES
History     Chief Complaint:  Abdominal Pain    The history is provided by the patient.      Marlene Pascual is a 48 year old female who presents to the emergency department for evaluation of abdominal pain. Of note, the patient recently underwent a pelvic ultrasound through her gynecologist secondary to heavy menstrual cramping and low back pain that was unremarkable per the patient, full results below. This morning the patient woke up with mild left lower quadrant abdominal pain, but thought nothing of it and went to work. For the past hour, the patient notes severe worsening of the abdominal pain prompting her to seek further evaluation here in the ED. Here, the patient complains of the left lower abdominal pain unlike any abdominal pain she has had previously. She additionally complains of the increased urinary frequency. She denies any co-occurrence of nausea, vomiting, or fevers as well as denying any history of abdominal surgeries or kidney stones. She has not had a bowel movement since the onset of the pain.     [facility]: 1/11/2019, Dr. Anthony Monique MD  Anteverted uterus with heterogeneous echotexture.  There appears to be a heterogeneous area abutting endo anteriorly. Possible fibroid measuring 2.4 x 2.7 x 2.7 cm. However it was not prominently seen in 3D imaging for reassurance.   Endometrium appears trilaminar and appears  in 3D?  Small hyperechoic complex area adjacent to endo noted measuring 0.7 x 0.5 x 0.8 cm.   Right Ovary: Hypoechoic cyst measuring 1.6 x 1.4 x 1.5 cm.   Adnexas and left ovary appear WNL.   No free fluid in cul-de-sac.     Allergies:  Sulfa Drugs     Medications:    Fluoxetine  Fluticasone  Topiramate  Phentermine  Diazepam  Tretinoin     Past Medical History:    Ductal Carcinoma of Left Breast   Obesity    Past Surgical History:    D&C  Bilateral Vein Stripping   Langlois Teeth Extraction    Family History:    No past pertinent family history.    Social History:  Marital  "Status:  Single [1]  Tobacco Use: Former, Quit: 2007  Alcohol Use: Yes    Review of Systems   Constitutional: Negative for fever.   Gastrointestinal: Positive for abdominal pain. Negative for nausea and vomiting.   Genitourinary: Positive for frequency.   All other systems reviewed and are negative.    Physical Exam     Patient Vitals for the past 24 hrs:   BP Temp Temp src Pulse Resp SpO2 Height   01/17/19 1325 101/60 -- -- 66 16 97 % --   01/17/19 1300 115/71 -- -- 66 -- 100 % --   01/17/19 1230 99/79 -- -- 72 -- 100 % --   01/17/19 1200 101/60 -- -- 66 -- 97 % --   01/17/19 1130 134/72 -- -- 55 -- 100 % --   01/17/19 1032 -- -- -- -- -- 99 % --   01/17/19 1030 (!) 148/93 -- -- -- -- 99 % --   01/17/19 1025 (!) 148/93 97.7  F (36.5  C) Oral 58 20 -- 1.651 m (5' 5\")     Physical Exam  General: Alert, appears well-developed and well-nourished. Cooperative.     In moderate distress  HEENT:  Head:  Atraumatic  Ears:  External ears are normal  Mouth/Throat:  Oropharynx is without erythema or exudate and mucous membranes are moist.   Eyes:   Conjunctivae normal and EOM are normal. No scleral icterus.  CV:  Normal rate, regular rhythm, normal heart sounds and radial pulses are 2+ and symmetric.  No murmur.  Resp:  Breath sounds are clear bilaterally    Non-labored, no retractions or accessory muscle use  GI:  Abdomen is soft, no distension, LLQ tenderness. No rebound or guarding.  No CVA tenderness bilaterally  MS:  Normal range of motion. No edema.    Normal strength in all 4 extremities.     Back atraumatic.    No midline cervical, thoracic, or lumbar tenderness  Skin:  Warm and dry.  No rash or lesions noted.  Neuro: Alert. Normal strength.  GCS: 15  Psych:  Normal mood and affect.    Emergency Department Course     Imaging:  CT Abdomen/Pelvis with IV contrast:   1. 0.3 cm stone left UVJ causes mild left hydronephrosis and hydroureter.  2. Abdominal and pelvic organs are otherwise within normal limits. Physiologic " corpus luteal cyst right ovary and trace amount of free pelvic fluid are likely physiologic.   As per radiology.    Laboratory:  CBC: WBC: 9.5, HGB: 13.2, PLT: 304  CMP: Glucose 128 (H), o/w WNL (Creatinine: 0.97)  Lipase: 79  UA with micro: Blood: Small, Protein Albumin: 10, Leukocyte Esterase: Moderate, WBC: 8 (H), Bacteria: Many, Squamous Epithelial: 22 (H), Mucous: Present, o/w negative  HCG Qualitative Urine: Negative    Interventions:  1035 Dilaudid 0.5 mg, IV  1037 NS 1L IV  1047 Zofran 4 mg, IV  1049 Dilaudid 0.5 mg, IV  1137 Toradol 15 mg, IV    Emergency Department Course:  1004 Nursing notes and vitals reviewed. I performed an exam of the patient as documented above.     IV inserted. Medicine administered as documented above. Blood drawn. This was sent to the lab for further testing, results above.    The patient provided a urine sample here in the emergency department. This was sent for laboratory testing, findings above.     The patient was sent for a CT of the abdomen/pelvis while in the emergency department, findings above.     1044 I rechecked the patient who is currently vomiting and complaining of pain.     1135 I rechecked the patient and informed her of the results of her workup thus far.     1306 I rechecked the patient and discussed the results of her workup thus far. I informed the patient of the plan for discharge.     Findings and plan explained to the patient. Patient discharged home with instructions regarding supportive care, medications, and reasons to return. The importance of close follow-up was reviewed.     I personally reviewed the laboratory results with the patient and answered all related questions prior to discharge.    Impression & Plan      Medical Decision Making:  Marlene Pascual is a 48 year old female who presents with left sided flank pain wrapping into the left lower quadrant.  A broad differential diagnosis was considered including diverticulitis, ureterolithiasis,  tumor, colitis, cholecystitis, aneurysm, dissection, volvulus, appendicitis, splenic issue, stomach pathology, ulcer, hydronephrosis, pneumonia, rib fracture, UTI, pyelonephritis, ectopic, ovarian cyst or torsion and pregnancy amongst many other etiologies. CBC, electrolytes and lipase are within normal limits. Urinalysis shows no signs of infection, but shows small amount of blood. Given the patient's presentation and not having a kidney stone in the past, CT of the abdomen and pelvis was obtained showing a 0.3 cm stone left UVJ causing mild left hydronephrosis and hydroureter. Patients pain is controlled in ED with dilaudid and vomiting has been controlled with Zofran.  No signs of infected stone.  Patient is hemodynamically stable in ED. Plan is home with abdominal pain recheck by primary care physician or return to ED if symptoms worsen.  Urology referral given to Dr. Pineda.  Return for fevers greater than 102, increasing pain, other new symptoms develop.  Ureterolithiasis precautions for home. I will additionally write for ibuprofen and Percocet for pain control and Zofran for symptomatic control. Incidental findings an CT show a right ovarian cyst and trace amount of pelvic fluid, OBGYN follow up as needed. Questions were answered. Patient discharged home.     Diagnosis:    ICD-10-CM    1. Kidney stone on left side N20.0    2. Left flank pain R10.9    3. Non-intractable vomiting with nausea, unspecified vomiting type R11.2        Disposition:  discharged to home    Discharge Medications:     Medication List      Started    ibuprofen 600 MG tablet  Commonly known as:  ADVIL/MOTRIN  600 mg, Oral, EVERY 6 HOURS PRN     ondansetron 4 MG ODT tab  Commonly known as:  ZOFRAN ODT  4 mg, Oral, EVERY 8 HOURS PRN     oxyCODONE-acetaminophen 5-325 MG tablet  Commonly known as:  PERCOCET  1-2 tablets, Oral, EVERY 4 HOURS PRN          Scribe Disclosure:  I, Aurelio Carreon, am serving as a scribe on 1/17/2019 at 10:03 AM to  personally document services performed by Jeronimo Delcid MD based on my observations and the provider's statements to me.     Aurelio Carreon  1/17/2019    EMERGENCY DEPARTMENT       Jeronimo Delcid MD  01/17/19 1952

## 2019-01-17 NOTE — ED AVS SNAPSHOT
Emergency Department  [address] [place] [id number]  Phone:  [phone]  Fax:  [phone]                                    Marlene Pascual   MRN: [id number]    Department:   Emergency Department   Date of Visit:  1/17/2019           After Visit Summary Signature Page    I have received my discharge instructions, and my questions have been answered. I have discussed any challenges I see with this plan with the nurse or doctor.    ..........................................................................................................................................  Patient/Patient Representative Signature      ..........................................................................................................................................  Patient Representative Print Name and Relationship to Patient    ..................................................               ................................................  Date                                   Time    ..........................................................................................................................................  Reviewed by Signature/Title    ...................................................              ..............................................  Date                                               Time          22EPIC Rev 08/18

## 2019-01-18 ENCOUNTER — TELEPHONE (OUTPATIENT)
Dept: ONCOLOGY | Facility: CLINIC | Age: 49
End: 2019-01-18

## 2019-01-18 NOTE — Clinical Note
Please print and send a copy of this letter and the patient's genetic testing report to the patient.    Please enclose test report: SEND OUTS MISCELLANEOUS order Send outs misc test [[id number]] (Order [id number])

## 2019-01-18 NOTE — LETTER
Cancer Risk Management  Program Locations    [facility]  [facility]  Mailing Address  Cancer Risk Management Program  [facility]  [address] [place] [address]    New patient appointments  [phone]  January 22, 2019    Marlene Pascual  [address]   [facility] [address]      Dear Marlene,    It was a pleasure speaking with you on the phone on 1/18/2019.  Here is a copy of the progress note from our discussion.  If you have any additional questions, please feel free to call.    Referring Provider: Axel Brito MD    Presenting Information:  I spoke to Marlene by phone today to discuss her genetic testing results. Her blood was drawn on 1/9/2019. BRCAplus (STAT breast panel) with the CancerNext panel was ordered  from [employer] [facility]. This testing was done because of Marlene's recent breast cancer diagnosis.  Results for all 34 genes are available today, and are discussed below.      Genetic Testing Result: NEGATIVE  Marlene is negative for mutations in the APC, STACEY, BARD1, BRCA1, BRCA2, BRIP1, BMPR1A, CDH1, CDK4, CDKN2A, CHEK2, DICER1, EPCAM, HOXB13, GREM1, MLH1, MRE11A, MSH2, MSH6, MUTYH, NBN, NF1, PALB2, PMS2, POLD1, POLE, PTEN, RAD50, RAD51C, RAD51D, SMAD4, SMARCA4, STK11, and TP53 genes.    No mutations were found in any of the 34 genes analyzed.  This test involved sequencing and deletion/duplication analysis of all genes with the exception of EPCAM and GREM1 (deletions only).    Testing did not detect an identifiable mutation associated with  Hereditary Breast and Ovarian Cancer syndrome (BRCA1, BRCA2), Short syndrome (MLH1, MSH2, MSH6, PMS2, EPCAM), Familial Adenomatous Polyposis (APC), Hereditary Diffuse Gastric Cancer (CDH1), Familial Atypical Multiple Mole Melanoma syndrome (CDK4, CDKN2A), Juvenile  Polyposis syndrome (BMPR1A, SMAD4), Cowden syndrome (PTEN), Li Fraumeni syndrome (TP53), Peutz-Jeghers syndrome (STK11), MUTYH Associated Polyposis (MUTYH), or Neurofibromatosis type 1 (NF1).    Interpretation:  We discussed several different interpretations of this negative test result.    1. One explanation may be that there is a different gene or combination of genes and environment that are associated with Marlene's breast cancer.    2. It is possible that her cancer was sporadic and caused by random cellular changes  3. There is also a small possibility that there is a mutation in one of these genes, and we could not find it with our current testing methods.       Screening:  Based on this negative test result, it is important for Marlene and her relatives to refer back to the family history for appropriate cancer screening.      Marlene should follow all recommendations made by her managing physicians.     Due to no available family history information, additional recommendations for screening should be made by Marlene's physicians.  Additional population cancer screening, such as recommendations by the American Cancer Society and the National Comprehensive Cancer Network (NCCN), are likely appropriate for Marlene at this time.  These screening recommendations may change if there are changes to Marlene's personal history or known family history.       Summary:  We do not have an explanation for Marlene's breast cancer diagnosis.  Because of that, it is important that she continue with cancer screening based on her personal history as discussed above.    Genetic testing is rapidly advancing, and new cancer susceptibility genes will most likely be identified in the future.  Therefore, I encouraged Marlene to contact me annually or if there are changes in her personal or family history.  This may change how we assess her cancer risk, screening, and the testing we would offer.    Plan:  1. A copy of the test results will be  mailed to Marlene with this letter.  2. Her next appointment is on 1/23/2019 for her breast cancer treatment.  3. She should contact me annually, or sooner if her family history changes.    If Marlene has any further questions, I encouraged her to contact me at [phone].    Adelaida Kendrick MS, [facility]  Licensed Genetic Counselor  [phone]

## 2019-01-18 NOTE — TELEPHONE ENCOUNTER
"1/18/2019    Referring Provider: Axel Brito MD    Presenting Information:  I spoke to Marlene by phone today to discuss her genetic testing results. Her blood was drawn on 1/9/2019. BRCAplus (STAT breast panel) with the CancerNext panel was ordered  from [employer]. This testing was done because of Marlene's recent breast cancer diagnosis.  Results for all 34 genes are available today, and are discussed below.      Genetic Testing Result: NEGATIVE  Marlene is negative for mutations in the APC, STACEY, BARD1, BRCA1, BRCA2, BRIP1, BMPR1A, CDH1, CDK4, CDKN2A, CHEK2, DICER1, EPCAM, HOXB13, GREM1, MLH1, MRE11A, MSH2, MSH6, MUTYH, NBN, NF1, PALB2, PMS2, POLD1, POLE, PTEN, RAD50, RAD51C, RAD51D, SMAD4, SMARCA4, STK11, and TP53 genes.    No mutations were found in any of the 34 genes analyzed.  This test involved sequencing and deletion/duplication analysis of all genes with the exception of EPCAM and GREM1 (deletions only).    Testing did not detect an identifiable mutation associated with  Hereditary Breast and Ovarian Cancer syndrome (BRCA1, BRCA2), Short syndrome (MLH1, MSH2, MSH6, PMS2, EPCAM), Familial Adenomatous Polyposis (APC), Hereditary Diffuse Gastric Cancer (CDH1), Familial Atypical Multiple Mole Melanoma syndrome (CDK4, CDKN2A), Juvenile Polyposis syndrome (BMPR1A, SMAD4), Cowden syndrome (PTEN), Li Fraumeni syndrome (TP53), Peutz-Jeghers syndrome (STK11), MUTYH Associated Polyposis (MUTYH), or Neurofibromatosis type 1 (NF1).    A copy of the test report can be found in the Laboratory tab, dated 1/9/2019, and named \"SEND OUTS MISC TEST\". The report is scanned in as a linked document.    Interpretation:  We discussed several different interpretations of this negative test result.    1. One explanation may be that there is a different gene or combination of genes and environment that are associated with Marlene's breast cancer.    2. It is possible that her cancer was sporadic and caused by random cellular " changes  3. There is also a small possibility that there is a mutation in one of these genes, and we could not find it with our current testing methods.       Screening:  Based on this negative test result, it is important for Marlene and her relatives to refer back to the family history for appropriate cancer screening.      Marlene should follow all recommendations made by her managing physicians.     Due to no available family history information, additional recommendations for screening should be made by Marlene's physicians.  Additional population cancer screening, such as recommendations by the American Cancer Society and the National Comprehensive Cancer Network (NCCN), are likely appropriate for Marlene at this time.  These screening recommendations may change if there are changes to Marlene's personal history or known family history.       Summary:  We do not have an explanation for Marlene's breast cancer diagnosis.  Because of that, it is important that she continue with cancer screening based on her personal history as discussed above.    Genetic testing is rapidly advancing, and new cancer susceptibility genes will most likely be identified in the future.  Therefore, I encouraged Marlene to contact me annually or if there are changes in her personal or family history.  This may change how we assess her cancer risk, screening, and the testing we would offer.    Plan:  1. A copy of the test results will be mailed to Marlene with this letter.  2. Her next appointment is on 1/23/2019 for her breast cancer treatment.  3. She should contact me annually, or sooner if her family history changes.    If Marlene has any further questions, I encouraged her to contact me at [phone].    Time spent on the phone: 5 minutes.    Adelaida Kendrick MS, [facility]  Licensed Genetic Counselor  [phone]

## 2019-01-21 LAB — LAB SCANNED RESULT: NORMAL

## 2019-01-21 RX ORDER — DIETHYLPROPION HYDROCHLORIDE 25 MG/1
75 TABLET ORAL DAILY
COMMUNITY

## 2019-01-22 ENCOUNTER — ANESTHESIA EVENT (OUTPATIENT)
Dept: SURGERY | Facility: AMBULATORY SURGERY CENTER | Age: 49
End: 2019-01-22

## 2019-01-23 ENCOUNTER — HOSPITAL ENCOUNTER (OUTPATIENT)
Facility: AMBULATORY SURGERY CENTER | Age: 49
End: 2019-01-23
Attending: SURGERY
Payer: COMMERCIAL

## 2019-01-23 ENCOUNTER — ANCILLARY PROCEDURE (OUTPATIENT)
Dept: MAMMOGRAPHY | Facility: CLINIC | Age: 49
End: 2019-01-23
Payer: COMMERCIAL

## 2019-01-23 ENCOUNTER — ANESTHESIA (OUTPATIENT)
Dept: SURGERY | Facility: AMBULATORY SURGERY CENTER | Age: 49
End: 2019-01-23

## 2019-01-23 VITALS
HEIGHT: 65 IN | OXYGEN SATURATION: 100 % | WEIGHT: 228.4 LBS | SYSTOLIC BLOOD PRESSURE: 110 MMHG | DIASTOLIC BLOOD PRESSURE: 73 MMHG | RESPIRATION RATE: 16 BRPM | BODY MASS INDEX: 38.05 KG/M2 | HEART RATE: 73 BPM | TEMPERATURE: 98.5 F

## 2019-01-23 DIAGNOSIS — D05.12 DUCTAL CARCINOMA IN SITU (DCIS) OF LEFT BREAST: ICD-10-CM

## 2019-01-23 DIAGNOSIS — D05.12 DUCTAL CARCINOMA IN SITU (DCIS) OF LEFT BREAST: Primary | ICD-10-CM

## 2019-01-23 RX ORDER — CEFAZOLIN SODIUM 1 G/3ML
INJECTION, POWDER, FOR SOLUTION INTRAMUSCULAR; INTRAVENOUS PRN
Status: DISCONTINUED | OUTPATIENT
Start: 2019-01-23 | End: 2019-01-23

## 2019-01-23 RX ORDER — HYDROMORPHONE HYDROCHLORIDE 1 MG/ML
.3-.5 INJECTION, SOLUTION INTRAMUSCULAR; INTRAVENOUS; SUBCUTANEOUS EVERY 10 MIN PRN
Status: DISCONTINUED | OUTPATIENT
Start: 2019-01-23 | End: 2019-01-24 | Stop reason: HOSPADM

## 2019-01-23 RX ORDER — LIDOCAINE HYDROCHLORIDE 10 MG/ML
10 INJECTION, SOLUTION EPIDURAL; INFILTRATION; INTRACAUDAL; PERINEURAL ONCE
Status: COMPLETED | OUTPATIENT
Start: 2019-01-23 | End: 2019-01-23

## 2019-01-23 RX ORDER — SODIUM CHLORIDE, SODIUM LACTATE, POTASSIUM CHLORIDE, CALCIUM CHLORIDE 600; 310; 30; 20 MG/100ML; MG/100ML; MG/100ML; MG/100ML
INJECTION, SOLUTION INTRAVENOUS CONTINUOUS
Status: DISCONTINUED | OUTPATIENT
Start: 2019-01-23 | End: 2019-01-24 | Stop reason: HOSPADM

## 2019-01-23 RX ORDER — BUPIVACAINE HYDROCHLORIDE 2.5 MG/ML
INJECTION, SOLUTION INFILTRATION; PERINEURAL PRN
Status: DISCONTINUED | OUTPATIENT
Start: 2019-01-23 | End: 2019-01-23 | Stop reason: HOSPADM

## 2019-01-23 RX ORDER — ONDANSETRON 2 MG/ML
INJECTION INTRAMUSCULAR; INTRAVENOUS PRN
Status: DISCONTINUED | OUTPATIENT
Start: 2019-01-23 | End: 2019-01-23

## 2019-01-23 RX ORDER — SODIUM CHLORIDE, SODIUM LACTATE, POTASSIUM CHLORIDE, CALCIUM CHLORIDE 600; 310; 30; 20 MG/100ML; MG/100ML; MG/100ML; MG/100ML
INJECTION, SOLUTION INTRAVENOUS CONTINUOUS
Status: DISCONTINUED | OUTPATIENT
Start: 2019-01-23 | End: 2019-01-23 | Stop reason: HOSPADM

## 2019-01-23 RX ORDER — ONDANSETRON 2 MG/ML
4 INJECTION INTRAMUSCULAR; INTRAVENOUS EVERY 30 MIN PRN
Status: DISCONTINUED | OUTPATIENT
Start: 2019-01-23 | End: 2019-01-24 | Stop reason: HOSPADM

## 2019-01-23 RX ORDER — GABAPENTIN 300 MG/1
300 CAPSULE ORAL ONCE
Status: COMPLETED | OUTPATIENT
Start: 2019-01-23 | End: 2019-01-23

## 2019-01-23 RX ORDER — MEPERIDINE HYDROCHLORIDE 25 MG/ML
12.5 INJECTION INTRAMUSCULAR; INTRAVENOUS; SUBCUTANEOUS
Status: DISCONTINUED | OUTPATIENT
Start: 2019-01-23 | End: 2019-01-24 | Stop reason: HOSPADM

## 2019-01-23 RX ORDER — OXYCODONE HYDROCHLORIDE 5 MG/1
5-10 TABLET ORAL EVERY 6 HOURS PRN
Qty: 6 TABLET | Refills: 0 | Status: SHIPPED | OUTPATIENT
Start: 2019-01-23 | End: 2019-01-26

## 2019-01-23 RX ORDER — ACETAMINOPHEN 325 MG/1
975 TABLET ORAL ONCE
Status: COMPLETED | OUTPATIENT
Start: 2019-01-23 | End: 2019-01-23

## 2019-01-23 RX ORDER — OXYCODONE HYDROCHLORIDE 5 MG/1
5 TABLET ORAL EVERY 4 HOURS PRN
Status: DISCONTINUED | OUTPATIENT
Start: 2019-01-23 | End: 2019-01-24 | Stop reason: HOSPADM

## 2019-01-23 RX ORDER — ONDANSETRON 4 MG/1
4 TABLET, ORALLY DISINTEGRATING ORAL EVERY 30 MIN PRN
Status: DISCONTINUED | OUTPATIENT
Start: 2019-01-23 | End: 2019-01-24 | Stop reason: HOSPADM

## 2019-01-23 RX ORDER — PROPOFOL 10 MG/ML
INJECTION, EMULSION INTRAVENOUS PRN
Status: DISCONTINUED | OUTPATIENT
Start: 2019-01-23 | End: 2019-01-23

## 2019-01-23 RX ORDER — CEFAZOLIN SODIUM 1 G/50ML
1 SOLUTION INTRAVENOUS SEE ADMIN INSTRUCTIONS
Status: DISCONTINUED | OUTPATIENT
Start: 2019-01-23 | End: 2019-01-23 | Stop reason: HOSPADM

## 2019-01-23 RX ORDER — CEFAZOLIN SODIUM 2 G/50ML
2 SOLUTION INTRAVENOUS
Status: DISCONTINUED | OUTPATIENT
Start: 2019-01-23 | End: 2019-01-23 | Stop reason: HOSPADM

## 2019-01-23 RX ORDER — KETAMINE HYDROCHLORIDE 10 MG/ML
INJECTION, SOLUTION INTRAMUSCULAR; INTRAVENOUS PRN
Status: DISCONTINUED | OUTPATIENT
Start: 2019-01-23 | End: 2019-01-23

## 2019-01-23 RX ORDER — LIDOCAINE HYDROCHLORIDE 20 MG/ML
INJECTION, SOLUTION INFILTRATION; PERINEURAL PRN
Status: DISCONTINUED | OUTPATIENT
Start: 2019-01-23 | End: 2019-01-23

## 2019-01-23 RX ORDER — FENTANYL CITRATE 50 UG/ML
25-50 INJECTION, SOLUTION INTRAMUSCULAR; INTRAVENOUS
Status: DISCONTINUED | OUTPATIENT
Start: 2019-01-23 | End: 2019-01-23 | Stop reason: HOSPADM

## 2019-01-23 RX ORDER — NALOXONE HYDROCHLORIDE 0.4 MG/ML
.1-.4 INJECTION, SOLUTION INTRAMUSCULAR; INTRAVENOUS; SUBCUTANEOUS
Status: DISCONTINUED | OUTPATIENT
Start: 2019-01-23 | End: 2019-01-24 | Stop reason: HOSPADM

## 2019-01-23 RX ORDER — ACETAMINOPHEN 325 MG/1
325-650 TABLET ORAL EVERY 4 HOURS PRN
Qty: 50 TABLET | Refills: 0 | Status: SHIPPED | OUTPATIENT
Start: 2019-01-23 | End: 2020-01-23

## 2019-01-23 RX ORDER — PROPOFOL 10 MG/ML
INJECTION, EMULSION INTRAVENOUS CONTINUOUS PRN
Status: DISCONTINUED | OUTPATIENT
Start: 2019-01-23 | End: 2019-01-23

## 2019-01-23 RX ORDER — LIDOCAINE HYDROCHLORIDE AND EPINEPHRINE 10; 10 MG/ML; UG/ML
INJECTION, SOLUTION INFILTRATION; PERINEURAL PRN
Status: DISCONTINUED | OUTPATIENT
Start: 2019-01-23 | End: 2019-01-23 | Stop reason: HOSPADM

## 2019-01-23 RX ORDER — GLYCOPYRROLATE 0.2 MG/ML
INJECTION, SOLUTION INTRAMUSCULAR; INTRAVENOUS PRN
Status: DISCONTINUED | OUTPATIENT
Start: 2019-01-23 | End: 2019-01-23

## 2019-01-23 RX ADMIN — LIDOCAINE HYDROCHLORIDE 100 MG: 20 INJECTION, SOLUTION INFILTRATION; PERINEURAL at 11:20

## 2019-01-23 RX ADMIN — ACETAMINOPHEN 975 MG: 325 TABLET ORAL at 09:37

## 2019-01-23 RX ADMIN — GABAPENTIN 300 MG: 300 CAPSULE ORAL at 09:37

## 2019-01-23 RX ADMIN — PROPOFOL 100 MCG/KG/MIN: 10 INJECTION, EMULSION INTRAVENOUS at 11:20

## 2019-01-23 RX ADMIN — LIDOCAINE HYDROCHLORIDE 10 ML: 10 INJECTION, SOLUTION EPIDURAL; INFILTRATION; INTRACAUDAL; PERINEURAL at 08:40

## 2019-01-23 RX ADMIN — SODIUM CHLORIDE, SODIUM LACTATE, POTASSIUM CHLORIDE, CALCIUM CHLORIDE: 600; 310; 30; 20 INJECTION, SOLUTION INTRAVENOUS at 09:38

## 2019-01-23 RX ADMIN — KETAMINE HYDROCHLORIDE 50 MG: 10 INJECTION, SOLUTION INTRAMUSCULAR; INTRAVENOUS at 11:30

## 2019-01-23 RX ADMIN — ONDANSETRON 4 MG: 2 INJECTION INTRAMUSCULAR; INTRAVENOUS at 11:20

## 2019-01-23 RX ADMIN — GLYCOPYRROLATE 0.1 MG: 0.2 INJECTION, SOLUTION INTRAMUSCULAR; INTRAVENOUS at 11:18

## 2019-01-23 RX ADMIN — CEFAZOLIN SODIUM 2 G: 1 INJECTION, POWDER, FOR SOLUTION INTRAMUSCULAR; INTRAVENOUS at 11:25

## 2019-01-23 RX ADMIN — PROPOFOL 30 MG: 10 INJECTION, EMULSION INTRAVENOUS at 11:20

## 2019-01-23 ASSESSMENT — MIFFLIN-ST. JEOR: SCORE: 1671.9

## 2019-01-23 NOTE — ANESTHESIA PREPROCEDURE EVALUATION
Anesthesia Pre-Procedure Evaluation    Patient: Marlene Pascual   MRN:     [id number] Gender:   female   Age:    48 year old :      1970        Preoperative Diagnosis: Left Ductal Carcinoma in Situ   Procedure(s):  Left Wire Localized Lumpectomy     No past medical history on file.   Past Surgical History:   Procedure Laterality Date     DILATION AND CURETTAGE SUCTION  2014    Procedure: DILATION AND CURETTAGE SUCTION;  Surgeon: Allison Andrea MD;  Location: [facility] OR     STRIP VEIN BILATERAL      right leg x 1, left leg x 2     wisdon teeth            Anesthesia Evaluation     .             ROS/MED HX    ENT/Pulmonary:  - neg pulmonary ROS     Neurologic:  - neg neurologic ROS     Cardiovascular:  - neg cardiovascular ROS       METS/Exercise Tolerance:     Hematologic:  - neg hematologic  ROS       Musculoskeletal:  - neg musculoskeletal ROS       GI/Hepatic:  - neg GI/hepatic ROS       Renal/Genitourinary:  - ROS Renal section negative       Endo:     (+) Obesity, .      Psychiatric:  - neg psychiatric ROS       Infectious Disease:  - neg infectious disease ROS       Malignancy:   (+) Malignancy History of Breast  DCIS        Other:    - neg other ROS                     PHYSICAL EXAM:   Mental Status/Neuro: A/A/O   Airway: Facies: Feasible  Mallampati: III  Mouth/Opening: Full  TM distance: > 6 cm  Neck ROM: Full   Respiratory: Auscultation: CTAB     Resp. Rate: Normal     Resp. Effort: Normal      CV: Rhythm: Regular  Rate: Age appropriate  Heart: Normal Sounds   Comments:      Dental: Normal                  Lab Results   Component Value Date    WBC 9.5 2019    HGB 13.2 2019    HCT 40.6 2019     2019     2019    POTASSIUM 3.7 2019    CHLORIDE 105 2019    CO2 25 2019    BUN 17 2019    CR 0.97 2019     (H) 2019    UNA 8.9 2019    ALBUMIN 3.9 2019    PROTTOTAL 8.0 2019    ALT 24 2019    AST 12  "01/17/2019    ALKPHOS 101 01/17/2019    BILITOTAL 0.5 01/17/2019    LIPASE 79 01/17/2019    HCG Negative 01/17/2019       Preop Vitals  BP Readings from Last 3 Encounters:   01/23/19 119/82   01/17/19 101/60   12/20/18 135/83    Pulse Readings from Last 3 Encounters:   01/23/19 73   01/17/19 66   12/20/18 70      Resp Readings from Last 3 Encounters:   01/23/19 16   01/17/19 16   12/20/18 14    SpO2 Readings from Last 3 Encounters:   01/23/19 98%   01/17/19 97%   12/20/18 99%      Temp Readings from Last 1 Encounters:   01/23/19 36.7  C (98  F) (Oral)    Ht Readings from Last 1 Encounters:   01/23/19 1.651 m (5' 5\")      Wt Readings from Last 1 Encounters:   01/23/19 103.6 kg (228 lb 6.4 oz)    Estimated body mass index is 38.01 kg/m  as calculated from the following:    Height as of this encounter: 1.651 m (5' 5\").    Weight as of this encounter: 103.6 kg (228 lb 6.4 oz).     LDA:  Peripheral IV 01/23/19 Right Hand (Active)   Site Assessment WDL 1/23/2019  9:46 AM   Line Status Infusing 1/23/2019  9:46 AM   Phlebitis Scale 0-->no symptoms 1/23/2019  9:46 AM   Infiltration Scale 0 1/23/2019  9:46 AM   Extravasation? No 1/23/2019  9:46 AM   Dressing Intervention New dressing  1/23/2019  9:46 AM   Number of days: 0            Assessment:   ASA SCORE: 3       Documentation: H&P complete; Preop Testing complete; Consents complete   Proceeding: Proceed without further delay     Plan:   Anes. Type:  MAC   Pre-Induction: Midazolam IV   Induction:  IV (Standard)   Airway: Native Airway (MAC safe nasal canula)   Access/Monitoring: PIV   Maintenance: Propofol; IV   Emergence: Procedure Site   Logistics: Same Day Surgery     Postop Pain/Sedation Strategy:  Standard-Options: Opioids PRN     PONV Management:  Adult Risk Factors: Female, Postop Opioids  Prevention: Propofol Infusion     CONSENT: Direct conversation   Plan and risks discussed with: Patient   Blood Products: Consent Deferred (Minimal Blood Loss)                   "     Johny Kraft MD  Staff Anesthesiologist  [phone]

## 2019-01-23 NOTE — OP NOTE
Procedure Date: 2019      PREOPERATIVE DIAGNOSIS:  Left breast ductal carcinoma in situ.      POSTOPERATIVE DIAGNOSIS:  Left breast ductal carcinoma in situ.      PROCEDURE:  Left wire localized lumpectomy.      ATTENDING SURGEON:  Axel Brito MD      ANESTHESIA:  Local with IV sedation.      INDICATIONS FOR SURGERY:  The patient is a 48-year-old woman who was found to have a small area of DCIS in her left breast.  She now presents for surgical treatment.      PROCEDURE IN DETAIL:  After informed consent, the patient was brought to the operating room, given IV sedation and prepped and draped in the usual fashion.  I administered a local anesthetic to the lower portion of her left breast.  A curvilinear incision was made at the lower edge of the nipple areolar complex.  The Bovie cautery was used to incise the subcutaneous tissues.  The dissection proceeded inferiorly and laterally to intercept the guidewire.  I then dissected circumferentially around the guidewire with the Bovie cautery.  The specimen was removed, oriented, and the specimen radiograph was obtained.  This did demonstrate complete excision of the target lesion and the microcalcifications and the guidewire.  I did excise a new anterior margin because the anterior margin was close to the wire.  Strict hemostasis was obtained with the Bovie cautery.  Surgical clips were placed in all 4 quadrants of the resection bed to facilitate radiation therapy.  The dermis was closed with interrupted 3-0 Vicryl suture.  The skin was closed with a running 4-0 PDS subcuticular stitch.  Dermabond was placed, and the patient was taken to the recovery room in stable condition.         AXEL BRITO MD             D: 2019   T: 2019   MT: yuliana      Name:     SHAGUFTA GARCIA   MRN:      [id number]        Account:        [id number]   :      1970           Procedure Date: 2019      Document: [id number]

## 2019-01-23 NOTE — PROGRESS NOTES
SBAR Wire Localization     SITUATION:  Patient to breast imaging center for imaging guided wire localizations before breast lumpectomy or excision biopsy without sentinel node injection.    BACKGROUND:  Breast imaging cancer, breast abnormality  Ordered procedure completed: Yes  Special needs identified: Yes     ASSESSMENT:  SBAR report called to patient care unit because of unexpected event in radiology: No  Allergies and medication list reviewed prior to procedure. Yes  Skin cleansed with ChloraPrep One-Step.  Anesthesia: approximately 10ml of 1% Lidocaine injection subcutaneous before wire insertion administered by the radiologist.   Gauze dressing over insertion site(s).  Post procedure mammogram completed: Yes    Patient tolerance: Patient tolerated procedure without issue    RECOMMENDATIONS:  Patient transferred to Same Day Surgery in stable condition via wheelchair with Breast Imaging Staff.  Copy of note given to patient and instructions to hand this note to surgery staff.    Please call Breast Center at [facility] [phone] if there are any questions.

## 2019-01-23 NOTE — DISCHARGE INSTRUCTIONS
"[facility]  Home Care Following Anesthesia  For 24 hours after surgery:  1. Get plenty of rest.  A responsible adult must stay with you for at least 24 hours after you leave the surgery center.  2. Do not drive or use heavy equipment.  If you have weakness or tingling, don't drive or use heavy equipment until this feeling goes away.   3. Do not drink alcohol.   4. Avoid strenuous or risky activities.  Ask for help when climbing stairs.  5. You may feel lightheaded.  IF so, sit for a few minutes before standing.  Have someone help you get up.   6. If you have nausea (feel sick to your stomach): Drink only clear liquids such as apple juice, ginger ale, broth or 7-Up.  Rest may also help.  Be sure to drink enough fluids.  Move to a regular diet as you feel able.   7. You may have a slight fever.  Call the doctor if your fever is over 100 F (37.7 C) (taken under the tongue) or lasts longer than 24 hours.  8. You may have a dry mouth, a sore throat, muscle aches or trouble sleeping. These should go away after 24 hours.  9. Do not make important or legal decisions.        Today you received a Marcaine or bupivacaine block to numb the nerves near your surgery site.  This is a block using local anesthetic or \"numbing\" medication injected around the nerves to anesthetize or \"numb\" the area supplied by those nerves.  This block is injected into the muscle layer near your surgical site.  The medication may numb the location where you had surgery for 6-18 hours, but may last up to 24 hours.  If your surgical site is an arm or leg you should be careful with your affected limb, since it is possible to injure your limb without being aware of it due to the numbing.  Until full feeling returns, you should guard against bumping or hitting your limb, and avoid extreme hot or cold temperatures on the skin.  As the block wears off, the feeling will return as a tingling or prickly sensation near your " surgical site.  You will experience more discomfort from your incision as the feeling returns.  You may want to take a pain pill (a narcotic or Tylenol if this was prescribed by your surgeon) when you start to experience mild pain before the pain beccomes more severe.  If your pain medications do not control your pain you should notifiy your surgeon.    Tips for taking pain medications  To get the best pain relief possible, remember these points:    Take pain medications as directed, before pain becomes severe.    Pain medication can upset your stomach: taking it with food may help.    Constipation is a common side effect of pain medication. Drink plenty of  fluids.    Eat foods high in fiber. Take a stool softener if recommended by your doctor or pharmacist.    Do not drink alcohol, drive or operate machinery while taking pain medications.    Ask about other ways to control pain, such as with heat, ice or relaxation.    Tylenol/Acetaminophen Consumption  To help encourage the safe use of acetaminophen, the makers of TYLENOL  have lowered the maximum daily dose for single-ingredient Extra Strength TYLENOL  (acetaminophen) products sold in the [place] from 8 pills per day (4,000 mg) to 6 pills per day (3,000 mg). The dosing interval has also changed from 2 pills every 4-6 hours to 2 pills every 6 hours.    If you feel your pain relief is insufficient, you may take Tylenol/Acetaminophen in addition to your narcotic pain medication.     Be careful not to exceed 3,000 mg of Tylenol/Acetaminophen in a 24 hour period from all sources.    If you are taking extra strength Tylenol/acetaminophen (500 mg), the maximum dose is 6 tablets in 24 hours.    If you are taking regular strength acetaminophen (325 mg), the maximum dose is 9 tablets in 24 hours.    Tylenol 975mg given at 9:37am; next dose available after 3:30pm. Then follow package instructions.     Call a doctor for any of the followin. Signs of infection (fever,  growing tenderness at the surgery site, a large amount of drainage or bleeding, severe pain, foul-smelling drainage, redness, swelling).  2. It has been over 8 to 10 hours since surgery and you are still not able to urinate (pass water).  3. Headache for over 24 hours.    Your doctor is:       Dr. Axel Brito, [facility]: [phone]               Or dial [phone] and ask for the resident on call for:  [facility]  For emergency care, call the:  [place] [facility]:  [phone] (TTY for hearing impaired: [phone])

## 2019-01-23 NOTE — ANESTHESIA CARE TRANSFER NOTE
Patient: Marlene Pascual    Procedure(s):  Left Wire Localized Lumpectomy    Diagnosis: Left Ductal Carcinoma in Situ  Diagnosis Additional Information: No value filed.    Anesthesia Type:   No value filed.     Note:  Airway :Room Air  Patient transferred to:Phase II  Comments: Arrive Phase II, Stable, Airway Intact  109/68, 77,12,97%,97.4  All questions answered.Handoff Report: Identifed the Patient, Identified the Reponsible Provider, Reviewed the pertinent medical history, Discussed the surgical course, Reviewed Intra-OP anesthesia mangement and issues during anesthesia, Set expectations for post-procedure period and Allowed opportunity for questions and acknowledgement of understanding      Vitals: (Last set prior to Anesthesia Care Transfer)    CRNA VITALS  1/23/2019 1146 - 1/23/2019 1219      1/23/2019             Pulse:  75    SpO2:  98 %    Resp Rate (set):  10                Electronically Signed By: CARLTON Mcclendon CRNA  January 23, 2019  12:19 PM

## 2019-01-23 NOTE — ANESTHESIA POSTPROCEDURE EVALUATION
Anesthesia POST Procedure Evaluation    Patient: Marlene Pascual   MRN:     [id number] Gender:   female   Age:    48 year old :      1970        Preoperative Diagnosis: Left Ductal Carcinoma in Situ   Procedure(s):  Left Wire Localized Lumpectomy   Postop Comments: No value filed.       Anesthesia Type:  MAC    Reportable Event: NO     PAIN: Uncomplicated   Sign Out status: Comfortable, Well controlled pain     PONV: No PONV   Sign Out status:  No Nausea or Vomiting     Neuro/Psych: Uneventful perioperative course   Sign Out Status: Preoperative baseline; Age appropriate mentation     Airway/Resp.: Uneventful perioperative course   Sign Out Status: Non labored breathing, age appropriate RR; Resp. Status within EXPECTED Parameters     CV: Uneventful perioperative course   Sign Out status: Appropriate BP and perfusion indices; Appropriate HR/Rhythm     Disposition:   Sign Out in:  PACU  Disposition:  Phase II; Home  Recovery Course: Uneventful  Follow-Up: Not required           Last Anesthesia Record Vitals:  CRNA VITALS  2019 1146 - 2019 1246      2019             Pulse:  75    SpO2:  98 %    Resp Rate (set):  10          Last PACU/Preop Vitals:  Vitals:    19 1218 19 1230 19 1245   BP: 109/68 106/70 110/73   Pulse:      Resp: 14 16 16   Temp: 36.4  C (97.5  F)  36.9  C (98.5  F)   SpO2: 98% 99% 100%         Electronically Signed By: Johny Kraft MD, 2019, 1:23 PM

## 2019-02-03 LAB — COPATH REPORT: NORMAL

## 2019-02-07 ENCOUNTER — OFFICE VISIT (OUTPATIENT)
Dept: ONCOLOGY | Facility: CLINIC | Age: 49
End: 2019-02-07
Attending: SURGERY
Payer: COMMERCIAL

## 2019-02-07 VITALS
HEART RATE: 88 BPM | SYSTOLIC BLOOD PRESSURE: 111 MMHG | DIASTOLIC BLOOD PRESSURE: 80 MMHG | BODY MASS INDEX: 37.05 KG/M2 | OXYGEN SATURATION: 98 % | WEIGHT: 222.4 LBS | HEIGHT: 65 IN

## 2019-02-07 DIAGNOSIS — D05.12 DUCTAL CARCINOMA IN SITU (DCIS) OF LEFT BREAST: Primary | ICD-10-CM

## 2019-02-07 PROCEDURE — G0463 HOSPITAL OUTPT CLINIC VISIT: HCPCS | Mod: ZF

## 2019-02-07 ASSESSMENT — PAIN SCALES - GENERAL: PAINLEVEL: NO PAIN (0)

## 2019-02-07 ASSESSMENT — MIFFLIN-ST. JEOR: SCORE: 1644.68

## 2019-02-07 NOTE — LETTER
2/7/2019       RE: Marlene Pascual  [address]  [place] [address]     Dear Colleague,    Thank you for referring your patient, Marlene Pascual, to the [facility] at [facility]. Please see a copy of my visit note below.    HISTORY OF PRESENT ILLNESS:  Marlene Pascual is here for a postoperative visit after undergoing a lumpectomy for DCIS.  Her final pathology report revealed a 6 mm area of grade 3 DCIS.  Her margins were negative.  There was no invasive disease.  She has done well since her surgery.      PHYSICAL EXAMINATION:  Her incision is healing well with no evidence of infection.      IMPRESSION:  Postop check.      PLAN:  I am going to refer her to Radiation Oncology.  Then I am going to have her follow up with Batsheva Ruiz in [facility] to  her on screening and on the use of endocrine therapy.         Axel Brito MD

## 2019-02-07 NOTE — PROGRESS NOTES
HISTORY OF PRESENT ILLNESS:  Marlene Pascual is here for a postoperative visit after undergoing a lumpectomy for DCIS.  Her final pathology report revealed a 6 mm area of grade 3 DCIS.  Her margins were negative.  There was no invasive disease.  She has done well since her surgery.      PHYSICAL EXAMINATION:  Her incision is healing well with no evidence of infection.      IMPRESSION:  Postop check.      PLAN:  I am going to refer her to Radiation Oncology.  Then I am going to have her follow up with Batsheva Ruiz in [facility] to  her on screening and on the use of endocrine therapy.

## 2019-02-07 NOTE — NURSING NOTE
"Oncology Rooming Note    February 7, 2019 8:56 AM   Marlene Pascual is a 48 year old female who presents for:    Chief Complaint   Patient presents with     Oncology Clinic Visit     Post op exam; Ductal carcinoma; Vitals completed by CMA     Initial Vitals: /80 (BP Location: Right arm, Patient Position: Chair, Cuff Size: Adult Large)   Pulse 88   Ht 1.651 m (5' 5\")   Wt 100.9 kg (222 lb 6.4 oz)   LMP 01/26/2019   SpO2 98%   Breastfeeding? No   BMI 37.01 kg/m   Estimated body mass index is 37.01 kg/m  as calculated from the following:    Height as of this encounter: 1.651 m (5' 5\").    Weight as of this encounter: 100.9 kg (222 lb 6.4 oz). Body surface area is 2.15 meters squared.  No Pain (0) Comment: Data Unavailable   Patient's last menstrual period was 01/26/2019.  Allergies reviewed: Yes  Medications reviewed: Yes    Medications: Medication refills not needed today.  Pharmacy name entered into [employer]: [id number] - [facility] - [address]  AT [facility]    Clinical concerns: No new concerns today  Dr. Brito  was notified.    10 minutes for nursing intake (face to face time)     Paula Butler              "

## 2019-02-15 ENCOUNTER — HEALTH MAINTENANCE LETTER (OUTPATIENT)
Age: 49
End: 2019-02-15

## 2019-02-18 ENCOUNTER — TRANSFERRED RECORDS (OUTPATIENT)
Dept: HEALTH INFORMATION MANAGEMENT | Facility: CLINIC | Age: 49
End: 2019-02-18

## 2019-03-26 ENCOUNTER — TRANSFERRED RECORDS (OUTPATIENT)
Dept: HEALTH INFORMATION MANAGEMENT | Facility: CLINIC | Age: 49
End: 2019-03-26

## 2019-06-20 NOTE — TELEPHONE ENCOUNTER
Melecio w/ Marquita @ [facility] - the pt has not had any visits since 3/26/19 (In [facility])    9:54 AM        RECORDS STATUS - BREAST    RECORDS REQUESTED FROM: Epic   DATE REQUESTED: 6/20/19   NOTES DETAILS STATUS   OFFICE NOTE from referring provider [facility] Dr. Brito   OFFICE NOTE from medical oncologist [facility]    OFFICE NOTE from surgeon     OFFICE NOTE from radiation oncologist [facility] 3/26/19   DISCHARGE SUMMARY from hospital     DISCHARGE REPORT from the ER     OPERATIVE REPORT [facility] 1/23/19   MEDICATION LIST [facility]/    CLINICAL TRIAL TREATMENTS TO DATE     LABS     PATHOLOGY REPORTS  (Tissue diagnosis, Stage, ER/AL percentage positive and intensity of staining, HER2 IHC, FISH, and all biopsies from breast and any distant metastasis)                 Epic 1/23/19: [id number]  12/6/18: [id number]   GENONOMIC TESTING     TYPE:   (Next Generation Sequencing, including Foundation One testing, and Oncotype score) Epic 1/9/19 - [facility]   IMAGING (NEED IMAGES & REPORT)     CT SCANS     MRI     MAMMO PACS    ULTRASOUND     PET     BONE SCAN     BRAIN MRI

## 2019-06-24 NOTE — PROGRESS NOTES
NEW CONSULTATION   2019     Marlene Pascual is a 49 year old woman who presents with ductal carcinoma in situ. She was referred by Dr. Aguilar.    HPI:    She was found on screening mammogram to have left breast calcifications. A stereotactic biopsy was done and pathology demonstrated DCIS. She underwent a left breast lumpectomy with Dr. Brito. Surgical pathology demonstrated a 6 mm area of DCIS, grade 3, ER positive, and negative margins. She has completed Radiation therapy in [facility].     She had genetic testing 2019 that was negative for mutations in the APC, STACEY, BARD1, BRCA1, BRCA2, BRIP1, BMPR1A, CDH1, CDK4, CDKN2A, CHEK2, DICER1, EPCAM, HOXB13, GREM1, MLH1, MRE11A, MSH2, MSH6, MUTYH, NBN, NF1, PALB2, PMS2, POLD1, POLE, PTEN, RAD50, RAD51C, RAD51D, SMAD4, SMARCA4, STK11, and TP53 genes. No mutations were found in any of the 34 genes analyzed.      She reports small bumps on the skin of her left breast since completing Radiation therapy.     BREAST-SPECIFIC HISTORY:    Previous breast imaging: Yes   -  CDI  - 18 Smammo, left breast calcifications BI-RADS 0  - 18 Dmammo left breast calcifications BI-RADS 4 biopsy recommended  - 19 wire-localizaton     Prior breast biopsies/surgeries: Yes   - 18 left breast 5:00 4.5 cm from the nipple stereotactic biopsy: DCIS, grade 3, solid and cribiform type with lobular involvement and comedonecrosis, ER positive   - 19 left breast wire-localized lumpectomy: DCIS 6 mm, grade 3, cribiform type with comedonecrosis, margins negative, ER positive    Prior history of breast cancer: Yes   - 2019 left breast DCIS 6 mm, grade 3, ER positive, s/p lumpectomy    Prior radiation history: Yes   - 19 left breast for DCIS    Self breast exams: Yes  Breast density: heterogeneously dense    GYN HISTORY:    Age at menarche: 13  Menopausal hormone replacement therapy: no    FAMILY HISTORY: Unknown because she is adopted.     PAST MEDICAL  "HISTORY  No past medical history on file.    PAST SURGICAL HISTORY   Past Surgical History:   Procedure Laterality Date     DILATION AND CURETTAGE SUCTION  6/19/2014    Procedure: DILATION AND CURETTAGE SUCTION;  Surgeon: Allison Andrea MD;  Location: [facility] OR     LUMPECTOMY BREAST Left 1/23/2019    Procedure: Left Wire Localized Lumpectomy;  Surgeon: Axel Brito MD;  Location: [facility] OR     STRIP VEIN BILATERAL      right leg x 1, left leg x 2     wisdon teeth       MEDICATIONS  Current Outpatient Medications   Medication Sig Dispense Refill     diethylpropion (TENUATE) 25 MG TABS tablet Take 75 mg by mouth daily       hydroquinone 4 % CREA Apply q hs 30 g 1     HYDROQUINONE as needed        tretinoin (RETIN-A) 0.05 % cream Apply topically At Bedtime 45 g 3     acetaminophen (TYLENOL) 325 MG tablet Take 1-2 tablets (325-650 mg) by mouth every 4 hours as needed for mild pain (Patient not taking: Reported on 6/26/2019) 50 tablet 0     cholecalciferol (VITAMIN D3) 1000 units (25 mcg) capsule Take 1 capsule by mouth daily       Misc. Devices (HIBICLENS HAND PUMP 16OZ) MISC Apply 1 dose topically daily. (Patient not taking: Reported on 12/20/2018) 1 each 11     ALLERGIES  Allergies   Allergen Reactions     Sulfa Drugs Rash        SOCIAL HISTORY:  Smokes: No, former  EtOH: Yes once per week  Exercise: walking    ROS:  Change in vision No  Headaches: no  Respiratory: No shortness of breath, dyspnea on exertion, cough, or hemoptysis   Cardiovascular: negative   Gastrointestinal: negative Abdominal pain: no  Breast: left breast skin bumps  Musculoskeletal: negative Joint pain No Back pain: no  Psychiatric: negative  Hematologic/Lymphatic/Immunologic: negative  Endocrine: negative    EXAM  /71 (BP Location: Right arm, Patient Position: Sitting, Cuff Size: Adult Regular)   Pulse 82   Temp 97.2  F (36.2  C) (Oral)   Resp 16   Ht 1.651 m (5' 5\")   Wt 94.3 kg (208 lb)   SpO2 100%   BMI 34.61 kg/m     PHYSICAL " EXAM  Respiratory: breathing non labored.   Breasts: Examination was done in both the upright and supine positions. No dominant fixed or suspicious masses noted. No skin or nipple changes. No nipple discharge. Left breast scar well healed. There area 3, 1-2 mm skin lesions inferior to the mammary fold. No sign of infection.   No clavicular, cervical, or axillary lymphadenopathy.     INVESTIGATIONS:  6/26/19 left diagnostic mammogram: no concerning findings per Radiology, final report pending.     ASSESSMENT/PLAN:    Marlene Pascual is a 49 year old woman with history of left breast grade 3 DCIS, ER positive s/p lumpectomy 12/2018 followed by Radiation. Tumor staging is EJVP2D3 or stage 0.     1) ER postive DCIS  We discussed that DCIS is a noninvasive cancer of the breast and the mainstay of treatment is surgical excision. We discussed that the goal of treatment of stage 0 breast cancer is curative or in other words to prevent future risk of recurrence. Discussed she is a candidate for risk-reducing intervention. Counseling was provided with available strategies including lifestyle modifications and risk reducing therapy.The contraindications for Tamoxifen (history of deep vein thrombosis or pulmonary embolism, history or stroke, history of TIA, known inherited clotting trait, pregnancy, and potential pregnancy without an effective nonhormal method of method of contraception) along with potential side effects (hot flashes, deep vein thrombosis, pulmonary embolism, stroke, cataracts, and endometrial cancer including signs and symptoms were discussed. Discussed the option of lower dose Tamoxifen.   - Tamoxifen 5 mg daily for 5 years, started 6/26/19   - Baseline gynecological examination recommended. Routine age appropriate gynecologic screening.  Will need to discuss contraception such as Mirena IUD.     2) Surveillance: History and physical examination every 6-12 months for 5 years, then annually. Mammogram every 12  months.  - Screening mammogram and clinic visit due: 11/22/19     3) Lifestyle Modifications were provided.   - Maintain a healthy weight. Your BMI is Body mass index is 34.61 kg/m . Recommended BMI is 20-25. Higher body mass index (BMI) and adult weight gain is associated with increased risk for breast cancer. This increase in risk has been attributed to increase in circulating endogenous estrogen levels from fat tissue.   - Alcohol consumption, even at moderate levels (1-2 drinks per day), increases breast cancer risk. Limit alcohol consumption to less than 1 drink per day. (1 ounce of liquor, 6 ounces of wine, or 8 ounces of beer)  - Exercise a minimum of 3 hours per week of moderate-intensity aerobic activity.     Batsheva Ruiz PA-C    Total time spent face to face with the patient was 40 minutes. 30 minutes was spent counseling the patient as described above.

## 2019-06-26 ENCOUNTER — PRE VISIT (OUTPATIENT)
Dept: SURGERY | Facility: CLINIC | Age: 49
End: 2019-06-26

## 2019-06-26 ENCOUNTER — ANCILLARY PROCEDURE (OUTPATIENT)
Dept: MAMMOGRAPHY | Facility: CLINIC | Age: 49
End: 2019-06-26
Attending: PHYSICIAN ASSISTANT
Payer: COMMERCIAL

## 2019-06-26 ENCOUNTER — OFFICE VISIT (OUTPATIENT)
Dept: SURGERY | Facility: CLINIC | Age: 49
End: 2019-06-26
Attending: PHYSICIAN ASSISTANT
Payer: COMMERCIAL

## 2019-06-26 ENCOUNTER — TELEPHONE (OUTPATIENT)
Dept: ONCOLOGY | Facility: CLINIC | Age: 49
End: 2019-06-26

## 2019-06-26 VITALS
HEART RATE: 82 BPM | HEIGHT: 65 IN | WEIGHT: 208 LBS | DIASTOLIC BLOOD PRESSURE: 71 MMHG | BODY MASS INDEX: 34.66 KG/M2 | RESPIRATION RATE: 16 BRPM | OXYGEN SATURATION: 100 % | SYSTOLIC BLOOD PRESSURE: 102 MMHG | TEMPERATURE: 97.2 F

## 2019-06-26 DIAGNOSIS — D05.12 DUCTAL CARCINOMA IN SITU (DCIS) OF LEFT BREAST: Primary | ICD-10-CM

## 2019-06-26 DIAGNOSIS — D05.12 DUCTAL CARCINOMA IN SITU (DCIS) OF LEFT BREAST: ICD-10-CM

## 2019-06-26 PROCEDURE — G0463 HOSPITAL OUTPT CLINIC VISIT: HCPCS | Mod: ZF

## 2019-06-26 PROCEDURE — 99215 OFFICE O/P EST HI 40 MIN: CPT | Mod: ZP | Performed by: PHYSICIAN ASSISTANT

## 2019-06-26 RX ORDER — TAMOXIFEN CITRATE 10 MG/1
5 TABLET ORAL DAILY
Qty: 30 TABLET | Refills: 3 | Status: SHIPPED | OUTPATIENT
Start: 2019-06-26

## 2019-06-26 ASSESSMENT — ENCOUNTER SYMPTOMS
NAIL CHANGES: 0
SKIN CHANGES: 0
POOR WOUND HEALING: 1

## 2019-06-26 ASSESSMENT — PAIN SCALES - GENERAL: PAINLEVEL: NO PAIN (0)

## 2019-06-26 ASSESSMENT — MIFFLIN-ST. JEOR: SCORE: 1574.36

## 2019-06-26 NOTE — TELEPHONE ENCOUNTER
ONCOLOGY INTAKE: Records Information      APPT INFORMATION:  Referring provider:  Batsheva Ruiz PA-C  Referring provider s clinic:   [facility]  Reason for visit/diagnosis:  Ductal carcinoma in situ (DCIS) of left breast   Has patient been notified of appointment date and time?: No    RECORDS INFORMATION:  Were the records received with the referral (via [employer])? No, internal referral    ADDITIONAL INFORMATION:  Left message for patient, provided hours/phone number.  Will try again on 6/27/2019 if appointment is not scheduled.    Please schedule off referral in EPIC

## 2019-06-26 NOTE — PATIENT INSTRUCTIONS
Marlene Pascual is a 49 year old woman with history of left breast grade 3 DCIS, ER positive s/p lumpectomy 12/2018 followed by Radiation. Tumor staging is [id number] or stage 0.      1) ER postive DCIS  We discussed that DCIS is a noninvasive cancer of the breast and the mainstay of treatment is surgical excision. We discussed that the goal of treatment of stage 0 breast cancer is curative or in other words to prevent future risk of recurrence. Discussed she is a candidate for risk-reducing intervention. Counseling was provided with available strategies including lifestyle modifications and risk reducing therapy.The contraindications for Tamoxifen (history of deep vein thrombosis or pulmonary embolism, history or stroke, history of TIA, known inherited clotting trait, pregnancy, and potential pregnancy without an effective nonhormal method of method of contraception) along with potential side effects (hot flashes, deep vein thrombosis, pulmonary embolism, stroke, cataracts, and endometrial cancer including signs and symptoms were discussed. Discussed the option of lower dose Tamoxifen.   - Tamoxifen 5 mg daily for 5 years.   - Baseline gynecological examination recommended. Routine age appropriate gynecologic screening.  Will need to discuss contraception such as Mirena IUD.      2) Surveillance: History and physical examination every 6-12 months for 5 years, then annually. Mammogram every 12 months.  - Screening mammogram and clinic visit due: 11/22/19      3) Referral to genetic counseling.     4) Lifestyle Modifications were provided.   - Maintain a healthy weight. Your BMI is Body mass index is 34.61 kg/m . Recommended BMI is 20-25. Higher body mass index (BMI) and adult weight gain is associated with increased risk for breast cancer. This increase in risk has been attributed to increase in circulating endogenous estrogen levels from fat tissue.   - Alcohol consumption, even at moderate levels (1-2 drinks per  day), increases breast cancer risk. Limit alcohol consumption to less than 1 drink per day. (1 ounce of liquor, 6 ounces of wine, or 8 ounces of beer)  - Exercise a minimum of 3 hours per week of moderate-intensity aerobic activity.

## 2019-06-26 NOTE — LETTER
6/26/2019       RE: Marlene Pascual  [address]  [facility] [address]     Dear Colleague,    Thank you for referring your patient, Marlene Pascual, to the [facility]. Please see a copy of my visit note below.    NEW CONSULTATION   Jun 26, 2019     Marlene Pascual is a 49 year old woman who presents with ductal carcinoma in situ. She was referred by Dr. Aguilar.    HPI:    She was found on screening mammogram to have left breast calcifications. A stereotactic biopsy was done and pathology demonstrated DCIS. She underwent a left breast lumpectomy with Dr. Brito. Surgical pathology demonstrated a 6 mm area of DCIS, grade 3, ER positive, and negative margins. She has completed Radiation therapy in [facility].     She had genetic testing 1/2019 that was negative for mutations in the APC, STACEY, BARD1, BRCA1, BRCA2, BRIP1, BMPR1A, CDH1, CDK4, CDKN2A, CHEK2, DICER1, EPCAM, HOXB13, GREM1, MLH1, MRE11A, MSH2, MSH6, MUTYH, NBN, NF1, PALB2, PMS2, POLD1, POLE, PTEN, RAD50, RAD51C, RAD51D, SMAD4, SMARCA4, STK11, and TP53 genes. No mutations were found in any of the 34 genes analyzed.      She reports small bumps on the skin of her left breast since completing Radiation therapy.     BREAST-SPECIFIC HISTORY:    Previous breast imaging: Yes   - 2017 CDI  - 11/21/18 Smammo, left breast calcifications BI-RADS 0  - 11/29/18 Dmammo left breast calcifications BI-RADS 4 biopsy recommended  - 1/23/19 wire-localizaton     Prior breast biopsies/surgeries: Yes   - 12/21/18 left breast 5:00 4.5 cm from the nipple stereotactic biopsy: DCIS, grade 3, solid and cribiform type with lobular involvement and comedonecrosis, ER positive   - 1/23/19 left breast wire-localized lumpectomy: DCIS 6 mm, grade 3, cribiform type with comedonecrosis, margins negative, ER positive    Prior history of breast cancer: Yes   - 1/2019 left breast DCIS 6 mm, grade 3, ER positive, s/p lumpectomy    Prior radiation history: Yes   - 6/24/19 left  breast for DCIS    Self breast exams: Yes  Breast density: heterogeneously dense    GYN HISTORY:    Age at menarche: 13  Menopausal hormone replacement therapy: no    FAMILY HISTORY: Unknown because she is adopted.     PAST MEDICAL HISTORY  No past medical history on file.    PAST SURGICAL HISTORY   Past Surgical History:   Procedure Laterality Date     DILATION AND CURETTAGE SUCTION  2014    Procedure: DILATION AND CURETTAGE SUCTION;  Surgeon: Allison Andrea MD;  Location: [facility] OR     LUMPECTOMY BREAST Left 2019    Procedure: Left Wire Localized Lumpectomy;  Surgeon: Axel Brito MD;  Location: [facility] OR     STRIP VEIN BILATERAL      right leg x 1, left leg x 2     wisdon teeth       MEDICATIONS  Current Outpatient Medications   Medication Sig Dispense Refill     diethylpropion (TENUATE) 25 MG TABS tablet Take 75 mg by mouth daily       hydroquinone 4 % CREA Apply q hs 30 g 1     HYDROQUINONE as needed        tretinoin (RETIN-A) 0.05 % cream Apply topically At Bedtime 45 g 3     acetaminophen (TYLENOL) 325 MG tablet Take 1-2 tablets (325-650 mg) by mouth every 4 hours as needed for mild pain (Patient not taking: Reported on 2019) 50 tablet 0     cholecalciferol (VITAMIN D3) 1000 units (25 mcg) capsule Take 1 capsule by mouth daily       Misc. Devices (HIBICLENS HAND PUMP 16OZ) MISC Apply 1 dose topically daily. (Patient not taking: Reported on 2018) 1 each 11     ALLERGIES  Allergies   Allergen Reactions     Sulfa Drugs Rash        SOCIAL HISTORY:  Smokes: No, former  EtOH: Yes once per week  Exercise: walking    ROS:  Change in vision No  Headaches: no  Respiratory: No shortness of breath, dyspnea on exertion, cough, or hemoptysis   Cardiovascular: negative   Gastrointestinal: negative Abdominal pain: no  Breast: left breast skin bumps  Musculoskeletal: negative Joint pain No Back pain: no  Psychiatric: negative  Hematologic/Lymphatic/Immunologic: negative  Endocrine: negative    EXAM  BP  "102/71 (BP Location: Right arm, Patient Position: Sitting, Cuff Size: Adult Regular)   Pulse 82   Temp 97.2  F (36.2  C) (Oral)   Resp 16   Ht 1.651 m (5' 5\")   Wt 94.3 kg (208 lb)   SpO2 100%   BMI 34.61 kg/m      PHYSICAL EXAM  Respiratory: breathing non labored.   Breasts: Examination was done in both the upright and supine positions. No dominant fixed or suspicious masses noted. No skin or nipple changes. No nipple discharge. Left breast scar well healed. There area 3, 1-2 mm skin lesions inferior to the mammary fold. No sign of infection.   No clavicular, cervical, or axillary lymphadenopathy.     INVESTIGATIONS:  6/26/19 left diagnostic mammogram: no concerning findings per Radiology, final report pending.     ASSESSMENT/PLAN:    Marlene Pascual is a 49 year old woman with history of left breast grade 3 DCIS, ER positive s/p lumpectomy 12/2018 followed by Radiation. Tumor staging is [id number] or stage 0.     1) ER postive DCIS  We discussed that DCIS is a noninvasive cancer of the breast and the mainstay of treatment is surgical excision. We discussed that the goal of treatment of stage 0 breast cancer is curative or in other words to prevent future risk of recurrence. Discussed she is a candidate for risk-reducing intervention. Counseling was provided with available strategies including lifestyle modifications and risk reducing therapy.The contraindications for Tamoxifen (history of deep vein thrombosis or pulmonary embolism, history or stroke, history of TIA, known inherited clotting trait, pregnancy, and potential pregnancy without an effective nonhormal method of method of contraception) along with potential side effects (hot flashes, deep vein thrombosis, pulmonary embolism, stroke, cataracts, and endometrial cancer including signs and symptoms were discussed. Discussed the option of lower dose Tamoxifen.   - Tamoxifen 5 mg daily for 5 years, started 6/26/19   - Baseline gynecological examination " recommended. Routine age appropriate gynecologic screening.  Will need to discuss contraception such as Mirena IUD.     2) Surveillance: History and physical examination every 6-12 months for 5 years, then annually. Mammogram every 12 months.  - Screening mammogram and clinic visit due: 11/22/19     3) Lifestyle Modifications were provided.   - Maintain a healthy weight. Your BMI is Body mass index is 34.61 kg/m . Recommended BMI is 20-25. Higher body mass index (BMI) and adult weight gain is associated with increased risk for breast cancer. This increase in risk has been attributed to increase in circulating endogenous estrogen levels from fat tissue.   - Alcohol consumption, even at moderate levels (1-2 drinks per day), increases breast cancer risk. Limit alcohol consumption to less than 1 drink per day. (1 ounce of liquor, 6 ounces of wine, or 8 ounces of beer)  - Exercise a minimum of 3 hours per week of moderate-intensity aerobic activity.     Batsheva Ruiz PA-C    Total time spent face to face with the patient was 40 minutes. 30 minutes was spent counseling the patient as described above.

## 2019-06-26 NOTE — NURSING NOTE
"Oncology Rooming Note    June 26, 2019 10:10 AM   Marlene Pascual is a 49 year old female who presents for:    Chief Complaint   Patient presents with     Oncology Clinic Visit     Ductal carcinoma in situ (DCIS) of left breast     Initial Vitals: There were no vitals taken for this visit. Estimated body mass index is 37.01 kg/m  as calculated from the following:    Height as of 2/7/19: 1.651 m (5' 5\").    Weight as of 2/7/19: 100.9 kg (222 lb 6.4 oz). There is no height or weight on file to calculate BSA.  Data Unavailable Comment: Data Unavailable   No LMP recorded.  Allergies reviewed: Yes  Medications reviewed: Yes    Medications: Medication refills not needed today.  Pharm[facility] into [facility]: [facility] [address] [place] [address] - [address]    Clinical concerns: none        Noemí Los, CMA              "

## 2019-09-30 ENCOUNTER — HEALTH MAINTENANCE LETTER (OUTPATIENT)
Age: 49
End: 2019-09-30

## 2020-01-06 PROBLEM — D05.12 DUCTAL CARCINOMA IN SITU (DCIS) OF LEFT BREAST: Status: ACTIVE | Noted: 2018-12-20

## 2020-07-29 DIAGNOSIS — D05.12 DUCTAL CARCINOMA IN SITU (DCIS) OF LEFT BREAST: ICD-10-CM

## 2020-07-29 NOTE — TELEPHONE ENCOUNTER
":ast visit notes state Pt should be on medication through 2024, but also state \"- Screening mammogram and clinic visit due: 11/22/19\". No visits were made and Pt has no appointments scheduled. Routing to provider to review.  "

## 2020-07-30 RX ORDER — TAMOXIFEN CITRATE 10 MG/1
5 TABLET ORAL DAILY
Qty: 30 TABLET | Refills: 3 | OUTPATIENT
Start: 2020-07-30

## 2021-01-15 ENCOUNTER — HEALTH MAINTENANCE LETTER (OUTPATIENT)
Age: 51
End: 2021-01-15

## 2021-03-14 ENCOUNTER — HEALTH MAINTENANCE LETTER (OUTPATIENT)
Age: 51
End: 2021-03-14

## 2021-10-24 ENCOUNTER — HEALTH MAINTENANCE LETTER (OUTPATIENT)
Age: 51
End: 2021-10-24

## 2022-02-13 ENCOUNTER — HEALTH MAINTENANCE LETTER (OUTPATIENT)
Age: 52
End: 2022-02-13

## 2022-04-10 ENCOUNTER — HEALTH MAINTENANCE LETTER (OUTPATIENT)
Age: 52
End: 2022-04-10

## 2022-10-15 ENCOUNTER — HEALTH MAINTENANCE LETTER (OUTPATIENT)
Age: 52
End: 2022-10-15

## 2023-03-26 ENCOUNTER — HEALTH MAINTENANCE LETTER (OUTPATIENT)
Age: 53
End: 2023-03-26

## 2023-06-01 ENCOUNTER — HEALTH MAINTENANCE LETTER (OUTPATIENT)
Age: 53
End: 2023-06-01

## 2025-03-13 ENCOUNTER — LAB REQUISITION (OUTPATIENT)
Dept: LAB | Facility: CLINIC | Age: 55
End: 2025-03-13

## 2025-03-13 DIAGNOSIS — Z11.3 ENCOUNTER FOR SCREENING FOR INFECTIONS WITH A PREDOMINANTLY SEXUAL MODE OF TRANSMISSION: ICD-10-CM

## 2025-03-13 DIAGNOSIS — Z13.220 ENCOUNTER FOR SCREENING FOR LIPOID DISORDERS: ICD-10-CM

## 2025-03-13 DIAGNOSIS — N95.1 MENOPAUSAL AND FEMALE CLIMACTERIC STATES: ICD-10-CM

## 2025-03-13 DIAGNOSIS — Z13.228 ENCOUNTER FOR SCREENING FOR OTHER METABOLIC DISORDERS: ICD-10-CM

## 2025-03-13 DIAGNOSIS — Z13.9 ENCOUNTER FOR SCREENING, UNSPECIFIED: ICD-10-CM

## 2025-03-13 LAB
BASOPHILS # BLD AUTO: 0.1 10E3/UL (ref 0–0.2)
BASOPHILS NFR BLD AUTO: 1 %
EOSINOPHIL # BLD AUTO: 0.1 10E3/UL (ref 0–0.7)
EOSINOPHIL NFR BLD AUTO: 2 %
ERYTHROCYTE [DISTWIDTH] IN BLOOD BY AUTOMATED COUNT: 13 % (ref 10–15)
HCT VFR BLD AUTO: 43.4 % (ref 35–47)
HGB BLD-MCNC: 13.6 G/DL (ref 11.7–15.7)
IMM GRANULOCYTES # BLD: 0 10E3/UL
IMM GRANULOCYTES NFR BLD: 0 %
LYMPHOCYTES # BLD AUTO: 1.8 10E3/UL (ref 0.8–5.3)
LYMPHOCYTES NFR BLD AUTO: 28 %
MCH RBC QN AUTO: 29.7 PG (ref 26.5–33)
MCHC RBC AUTO-ENTMCNC: 31.3 G/DL (ref 31.5–36.5)
MCV RBC AUTO: 95 FL (ref 78–100)
MONOCYTES # BLD AUTO: 0.7 10E3/UL (ref 0–1.3)
MONOCYTES NFR BLD AUTO: 10 %
NEUTROPHILS # BLD AUTO: 4 10E3/UL (ref 1.6–8.3)
NEUTROPHILS NFR BLD AUTO: 60 %
NRBC # BLD AUTO: 0 10E3/UL
NRBC BLD AUTO-RTO: 0 /100
PLATELET # BLD AUTO: 372 10E3/UL (ref 150–450)
RBC # BLD AUTO: 4.58 10E6/UL (ref 3.8–5.2)
T PALLIDUM AB SER QL: NONREACTIVE
WBC # BLD AUTO: 6.7 10E3/UL (ref 4–11)

## 2025-03-13 PROCEDURE — 85018 HEMOGLOBIN: CPT | Performed by: OBSTETRICS & GYNECOLOGY

## 2025-03-13 PROCEDURE — 86780 TREPONEMA PALLIDUM: CPT | Performed by: OBSTETRICS & GYNECOLOGY

## 2025-03-13 PROCEDURE — 85004 AUTOMATED DIFF WBC COUNT: CPT | Performed by: OBSTETRICS & GYNECOLOGY

## 2025-03-15 LAB
CHOLEST SERPL-MCNC: 216 MG/DL
FASTING STATUS PATIENT QL REPORTED: YES
FSH SERPL IRP2-ACNC: 76.6 MIU/ML
HBV SURFACE AG SERPL QL IA: NONREACTIVE
HCV AB SERPL QL IA: NONREACTIVE
HDLC SERPL-MCNC: 64 MG/DL
HIV 1+2 AB+HIV1 P24 AG SERPL QL IA: NONREACTIVE
LDLC SERPL CALC-MCNC: 131 MG/DL
NONHDLC SERPL-MCNC: 152 MG/DL
TRIGL SERPL-MCNC: 103 MG/DL
TSH SERPL DL<=0.005 MIU/L-ACNC: 2.24 UIU/ML (ref 0.3–4.2)

## 2025-03-16 LAB
ALBUMIN SERPL BCG-MCNC: 4.6 G/DL (ref 3.5–5.2)
ALP SERPL-CCNC: 95 U/L (ref 40–150)
ALT SERPL W P-5'-P-CCNC: 14 U/L (ref 0–50)
ANION GAP SERPL CALCULATED.3IONS-SCNC: 12 MMOL/L (ref 7–15)
AST SERPL W P-5'-P-CCNC: 18 U/L (ref 0–45)
BILIRUB SERPL-MCNC: 0.2 MG/DL
BUN SERPL-MCNC: 18.5 MG/DL (ref 6–20)
CALCIUM SERPL-MCNC: 9.5 MG/DL (ref 8.8–10.4)
CHLORIDE SERPL-SCNC: 102 MMOL/L (ref 98–107)
CREAT SERPL-MCNC: 0.81 MG/DL (ref 0.51–0.95)
EGFRCR SERPLBLD CKD-EPI 2021: 86 ML/MIN/1.73M2
FASTING STATUS PATIENT QL REPORTED: YES
GLUCOSE SERPL-MCNC: 72 MG/DL (ref 70–99)
HCO3 SERPL-SCNC: 25 MMOL/L (ref 22–29)
POTASSIUM SERPL-SCNC: 4.6 MMOL/L (ref 3.4–5.3)
PROT SERPL-MCNC: 7.7 G/DL (ref 6.4–8.3)
SODIUM SERPL-SCNC: 139 MMOL/L (ref 135–145)

## (undated) DEVICE — BASIN SET SINGLE STERILE 13752-624

## (undated) DEVICE — SYR 05ML LL W/O NDL

## (undated) DEVICE — ESU GROUND PAD ADULT W/CORD E7507

## (undated) DEVICE — MARKER MARGIN MARKER STD 6 COLOR SGL USE MMS6

## (undated) DEVICE — NDL 27GA 1.25" 305136

## (undated) DEVICE — SU PDS II 4-0 FS-2 27" Z422H

## (undated) DEVICE — PREP CHLORAPREP 26ML TINTED ORANGE  260815

## (undated) DEVICE — SU VICRYL 3-0 SH 27" J316H

## (undated) DEVICE — GLOVE PROTEXIS POWDER FREE SMT 8.0  2D72PT80X

## (undated) DEVICE — SU SILK 3-0 SH 30" K832H

## (undated) DEVICE — PACK MINOR CUSTOM ASC

## (undated) DEVICE — SU DERMABOND ADVANCED .7ML DNX12

## (undated) DEVICE — CLIP HORIZON SM RED WIDE SLOT 001201

## (undated) DEVICE — NDL BLUNT 18GA 1" W/O FILTER 305181

## (undated) DEVICE — BNDG COBAN 2"X5YDS CO-FLEX UNSTERILE ASSRTD CLRS LF 5200CP

## (undated) DEVICE — DRAPE LAP TRANSVERSE 29421

## (undated) DEVICE — PAD CHUX UNDERPAD 30X30"

## (undated) DEVICE — SUCTION MANIFOLD NEPTUNE 2 SYS 1 PORT 702-025-000

## (undated) DEVICE — NDL 25GA 2"  8881200441

## (undated) DEVICE — DRAPE SHEET MED 44X70" 9355

## (undated) DEVICE — PREP PAD ALCOHOL 6818

## (undated) DEVICE — CLIP HORIZON MED BLUE 002200

## (undated) DEVICE — SOL NACL 0.9% IRRIG 500ML BOTTLE 2F7123

## (undated) DEVICE — DRAPE IOBAN INCISE 23X17" 6650EZ

## (undated) DEVICE — SOL WATER IRRIG 500ML BOTTLE 2F7113

## (undated) DEVICE — ESU ELEC BLADE 2.75" COATED/INSULATED E1455

## (undated) DEVICE — LINEN TOWEL PACK X5 5464

## (undated) RX ORDER — CEFAZOLIN SODIUM 1 G/3ML
INJECTION, POWDER, FOR SOLUTION INTRAMUSCULAR; INTRAVENOUS
Status: DISPENSED
Start: 2019-01-23

## (undated) RX ORDER — GABAPENTIN 300 MG/1
CAPSULE ORAL
Status: DISPENSED
Start: 2019-01-23

## (undated) RX ORDER — ACETAMINOPHEN 325 MG/1
TABLET ORAL
Status: DISPENSED
Start: 2019-01-23

## (undated) RX ORDER — LIDOCAINE HYDROCHLORIDE 20 MG/ML
INJECTION, SOLUTION EPIDURAL; INFILTRATION; INTRACAUDAL; PERINEURAL
Status: DISPENSED
Start: 2019-01-23

## (undated) RX ORDER — KETAMINE HYDROCHLORIDE 10 MG/ML
INJECTION INTRAMUSCULAR; INTRAVENOUS
Status: DISPENSED
Start: 2019-01-23

## (undated) RX ORDER — PROPOFOL 10 MG/ML
INJECTION, EMULSION INTRAVENOUS
Status: DISPENSED
Start: 2019-01-23

## (undated) RX ORDER — GABAPENTIN 100 MG/1
CAPSULE ORAL
Status: DISPENSED
Start: 2019-01-23